# Patient Record
Sex: MALE | Race: WHITE | URBAN - METROPOLITAN AREA
[De-identification: names, ages, dates, MRNs, and addresses within clinical notes are randomized per-mention and may not be internally consistent; named-entity substitution may affect disease eponyms.]

---

## 2021-05-25 ENCOUNTER — HOSPITAL ENCOUNTER (OUTPATIENT)
Dept: MEDSURG UNIT | Facility: HOSPITAL | Age: 76
End: 2021-05-27
Attending: INTERNAL MEDICINE | Admitting: INTERNAL MEDICINE

## 2021-05-25 LAB
DEPRECATED CALCIDIOL+CALCIFEROL SERPL-MC: 16 NG/ML (ref 30–80)
FOLATE SERPL-MCNC: 9.4 NG/ML (ref 7–31.4)
PREALB SERPL-MCNC: 11.2 MG/DL (ref 16–42)
TSH SERPL-ACNC: 2.15 UIU/ML (ref 0.35–4.94)
VIT B12 SERPL-MCNC: 447 PG/ML (ref 213–816)

## 2021-05-26 LAB
ABS NEUT (OLG): 3.64 X10(3)/MCL (ref 2.1–9.2)
BASOPHILS # BLD AUTO: 0 X10(3)/MCL (ref 0–0.2)
BASOPHILS NFR BLD AUTO: 0 %
CHOLEST SERPL-MCNC: 146 MG/DL
CHOLEST/HDLC SERPL: 4 {RATIO} (ref 0–5)
EOSINOPHIL # BLD AUTO: 0.1 X10(3)/MCL (ref 0–0.9)
EOSINOPHIL NFR BLD AUTO: 1 %
ERYTHROCYTE [DISTWIDTH] IN BLOOD BY AUTOMATED COUNT: 14.4 % (ref 11.5–17)
HCT VFR BLD AUTO: 39.7 % (ref 42–52)
HDLC SERPL-MCNC: 35 MG/DL (ref 35–60)
HGB BLD-MCNC: 13 GM/DL (ref 14–18)
IMM GRANULOCYTES # BLD AUTO: 0.01 % (ref 0–0.02)
IMM GRANULOCYTES NFR BLD AUTO: 0.2 % (ref 0–0.43)
LDLC SERPL CALC-MCNC: 100 MG/DL (ref 50–140)
LYMPHOCYTES # BLD AUTO: 1.6 X10(3)/MCL (ref 0.6–4.6)
LYMPHOCYTES NFR BLD AUTO: 26 %
MCH RBC QN AUTO: 30.3 PG (ref 27–31)
MCHC RBC AUTO-ENTMCNC: 32.7 GM/DL (ref 33–36)
MCV RBC AUTO: 92.5 FL (ref 80–94)
MONOCYTES # BLD AUTO: 0.6 X10(3)/MCL (ref 0.1–1.3)
MONOCYTES NFR BLD AUTO: 11 %
NEUTROPHILS # BLD AUTO: 3.64 X10(3)/MCL (ref 1.4–7.9)
NEUTROPHILS NFR BLD AUTO: 62 %
PLATELET # BLD AUTO: 225 X10(3)/MCL (ref 130–400)
PMV BLD AUTO: 9.2 FL (ref 9.4–12.4)
RBC # BLD AUTO: 4.29 X10(6)/MCL (ref 4.7–6.1)
TRIGL SERPL-MCNC: 56 MG/DL (ref 34–140)
VLDLC SERPL CALC-MCNC: 11 MG/DL
WBC # SPEC AUTO: 5.9 X10(3)/MCL (ref 4.5–11.5)

## 2021-05-27 LAB
ABS NEUT (OLG): 3.48 X10(3)/MCL (ref 2.1–9.2)
ABS NEUT (OLG): 3.79 X10(3)/MCL (ref 2.1–9.2)
BASOPHILS # BLD AUTO: 0 X10(3)/MCL (ref 0–0.2)
BASOPHILS # BLD AUTO: 0 X10(3)/MCL (ref 0–0.2)
BASOPHILS NFR BLD AUTO: 0 %
BASOPHILS NFR BLD AUTO: 0 %
EOSINOPHIL # BLD AUTO: 0.1 X10(3)/MCL (ref 0–0.9)
EOSINOPHIL # BLD AUTO: 0.1 X10(3)/MCL (ref 0–0.9)
EOSINOPHIL NFR BLD AUTO: 2 %
EOSINOPHIL NFR BLD AUTO: 2 %
ERYTHROCYTE [DISTWIDTH] IN BLOOD BY AUTOMATED COUNT: 14.6 % (ref 11.5–17)
ERYTHROCYTE [DISTWIDTH] IN BLOOD BY AUTOMATED COUNT: 14.6 % (ref 11.5–17)
FOLATE SERPL-MCNC: 13.7 NG/ML (ref 7–31.4)
HCT VFR BLD AUTO: 39.3 % (ref 42–52)
HCT VFR BLD AUTO: 40.6 % (ref 42–52)
HGB BLD-MCNC: 12.8 GM/DL (ref 14–18)
HGB BLD-MCNC: 13.3 GM/DL (ref 14–18)
IMM GRANULOCYTES # BLD AUTO: 0.02 % (ref 0–0.02)
IMM GRANULOCYTES # BLD AUTO: 0.02 % (ref 0–0.02)
IMM GRANULOCYTES NFR BLD AUTO: 0.3 % (ref 0–0.43)
IMM GRANULOCYTES NFR BLD AUTO: 0.3 % (ref 0–0.43)
LYMPHOCYTES # BLD AUTO: 1.4 X10(3)/MCL (ref 0.6–4.6)
LYMPHOCYTES # BLD AUTO: 2 X10(3)/MCL (ref 0.6–4.6)
LYMPHOCYTES NFR BLD AUTO: 23 %
LYMPHOCYTES NFR BLD AUTO: 32 %
MCH RBC QN AUTO: 30.2 PG (ref 27–31)
MCH RBC QN AUTO: 30.6 PG (ref 27–31)
MCHC RBC AUTO-ENTMCNC: 32.6 GM/DL (ref 33–36)
MCHC RBC AUTO-ENTMCNC: 32.8 GM/DL (ref 33–36)
MCV RBC AUTO: 92.7 FL (ref 80–94)
MCV RBC AUTO: 93.3 FL (ref 80–94)
MONOCYTES # BLD AUTO: 0.7 X10(3)/MCL (ref 0.1–1.3)
MONOCYTES # BLD AUTO: 0.8 X10(3)/MCL (ref 0.1–1.3)
MONOCYTES NFR BLD AUTO: 11 %
MONOCYTES NFR BLD AUTO: 13 %
NEUTROPHILS # BLD AUTO: 3.48 X10(3)/MCL (ref 1.4–7.9)
NEUTROPHILS # BLD AUTO: 3.79 X10(3)/MCL (ref 1.4–7.9)
NEUTROPHILS NFR BLD AUTO: 55 %
NEUTROPHILS NFR BLD AUTO: 62 %
PLATELET # BLD AUTO: 229 X10(3)/MCL (ref 130–400)
PLATELET # BLD AUTO: 232 X10(3)/MCL (ref 130–400)
PMV BLD AUTO: 9.2 FL (ref 9.4–12.4)
PMV BLD AUTO: 9.2 FL (ref 9.4–12.4)
RBC # BLD AUTO: 4.24 X10(6)/MCL (ref 4.7–6.1)
RBC # BLD AUTO: 4.35 X10(6)/MCL (ref 4.7–6.1)
VIT B12 SERPL-MCNC: 466 PG/ML (ref 213–816)
WBC # SPEC AUTO: 6.2 X10(3)/MCL (ref 4.5–11.5)
WBC # SPEC AUTO: 6.4 X10(3)/MCL (ref 4.5–11.5)

## 2021-06-03 LAB
ABS NEUT (OLG): 4.57 X10(3)/MCL (ref 2.1–9.2)
BASOPHILS # BLD AUTO: 0 X10(3)/MCL (ref 0–0.2)
BASOPHILS NFR BLD AUTO: 1 %
BUN SERPL-MCNC: 16.7 MG/DL (ref 8.4–25.7)
CALCIUM SERPL-MCNC: 8.9 MG/DL (ref 8.8–10)
CHLORIDE SERPL-SCNC: 101 MMOL/L (ref 98–107)
CO2 SERPL-SCNC: 25 MMOL/L (ref 23–31)
CREAT SERPL-MCNC: 0.54 MG/DL (ref 0.73–1.18)
CREAT/UREA NIT SERPL: 31
EOSINOPHIL # BLD AUTO: 0.1 X10(3)/MCL (ref 0–0.9)
EOSINOPHIL NFR BLD AUTO: 1 %
ERYTHROCYTE [DISTWIDTH] IN BLOOD BY AUTOMATED COUNT: 14.4 % (ref 11.5–17)
GLUCOSE SERPL-MCNC: 92 MG/DL (ref 82–115)
HCT VFR BLD AUTO: 40.7 % (ref 42–52)
HGB BLD-MCNC: 13 GM/DL (ref 14–18)
IMM GRANULOCYTES # BLD AUTO: 0.07 % (ref 0–0.02)
IMM GRANULOCYTES NFR BLD AUTO: 1 % (ref 0–0.43)
LYMPHOCYTES # BLD AUTO: 1.5 X10(3)/MCL (ref 0.6–4.6)
LYMPHOCYTES NFR BLD AUTO: 22 %
MCH RBC QN AUTO: 30 PG (ref 27–31)
MCHC RBC AUTO-ENTMCNC: 31.9 GM/DL (ref 33–36)
MCV RBC AUTO: 94 FL (ref 80–94)
MONOCYTES # BLD AUTO: 0.7 X10(3)/MCL (ref 0.1–1.3)
MONOCYTES NFR BLD AUTO: 10 %
NEUTROPHILS # BLD AUTO: 4.57 X10(3)/MCL (ref 1.4–7.9)
NEUTROPHILS NFR BLD AUTO: 65 %
PLATELET # BLD AUTO: 327 X10(3)/MCL (ref 130–400)
PMV BLD AUTO: 9.7 FL (ref 9.4–12.4)
POTASSIUM SERPL-SCNC: 3.7 MMOL/L (ref 3.5–5.1)
RBC # BLD AUTO: 4.33 X10(6)/MCL (ref 4.7–6.1)
SODIUM SERPL-SCNC: 134 MMOL/L (ref 136–145)
WBC # SPEC AUTO: 7 X10(3)/MCL (ref 4.5–11.5)

## 2021-06-07 LAB
ALBUMIN SERPL-MCNC: 3 GM/DL (ref 3.4–4.8)
ALBUMIN/GLOB SERPL: 0.8 RATIO (ref 1.1–2)
ALP SERPL-CCNC: 74 UNIT/L (ref 40–150)
ALT SERPL-CCNC: 16 UNIT/L (ref 0–55)
AST SERPL-CCNC: 21 UNIT/L (ref 5–34)
BILIRUB SERPL-MCNC: 0.4 MG/DL
BILIRUBIN DIRECT+TOT PNL SERPL-MCNC: 0.1 MG/DL (ref 0–0.8)
BILIRUBIN DIRECT+TOT PNL SERPL-MCNC: 0.3 MG/DL (ref 0–0.5)
BUN SERPL-MCNC: 16.4 MG/DL (ref 8.4–25.7)
CALCIUM SERPL-MCNC: 9.2 MG/DL (ref 8.8–10)
CHLORIDE SERPL-SCNC: 103 MMOL/L (ref 98–107)
CHOLEST SERPL-MCNC: 87 MG/DL
CHOLEST/HDLC SERPL: 4 {RATIO} (ref 0–5)
CO2 SERPL-SCNC: 24 MMOL/L (ref 23–31)
CREAT SERPL-MCNC: 0.56 MG/DL (ref 0.73–1.18)
GLOBULIN SER-MCNC: 3.9 GM/DL (ref 2.4–3.5)
GLUCOSE SERPL-MCNC: 102 MG/DL (ref 82–115)
HDLC SERPL-MCNC: 22 MG/DL (ref 35–60)
LDLC SERPL CALC-MCNC: 57 MG/DL (ref 50–140)
POTASSIUM SERPL-SCNC: 3.3 MMOL/L (ref 3.5–5.1)
PROT SERPL-MCNC: 6.9 GM/DL (ref 5.8–7.6)
SODIUM SERPL-SCNC: 136 MMOL/L (ref 136–145)
TRIGL SERPL-MCNC: 40 MG/DL (ref 34–140)
VLDLC SERPL CALC-MCNC: 8 MG/DL

## 2021-06-09 LAB
ABS NEUT (OLG): 7.05 X10(3)/MCL (ref 2.1–9.2)
ALBUMIN SERPL-MCNC: 2.9 GM/DL (ref 3.4–4.8)
ALBUMIN/GLOB SERPL: 0.7 RATIO (ref 1.1–2)
ALP SERPL-CCNC: 77 UNIT/L (ref 40–150)
ALT SERPL-CCNC: 14 UNIT/L (ref 0–55)
AST SERPL-CCNC: 26 UNIT/L (ref 5–34)
BASOPHILS # BLD AUTO: 0 X10(3)/MCL (ref 0–0.2)
BASOPHILS NFR BLD AUTO: 0 %
BILIRUB SERPL-MCNC: 0.6 MG/DL
BILIRUBIN DIRECT+TOT PNL SERPL-MCNC: 0.2 MG/DL (ref 0–0.5)
BILIRUBIN DIRECT+TOT PNL SERPL-MCNC: 0.4 MG/DL (ref 0–0.8)
BUN SERPL-MCNC: 16.9 MG/DL (ref 8.4–25.7)
CALCIUM SERPL-MCNC: 9.1 MG/DL (ref 8.8–10)
CHLORIDE SERPL-SCNC: 103 MMOL/L (ref 98–107)
CO2 SERPL-SCNC: 20 MMOL/L (ref 23–31)
CREAT SERPL-MCNC: 0.52 MG/DL (ref 0.73–1.18)
EOSINOPHIL # BLD AUTO: 0.1 X10(3)/MCL (ref 0–0.9)
EOSINOPHIL NFR BLD AUTO: 1 %
ERYTHROCYTE [DISTWIDTH] IN BLOOD BY AUTOMATED COUNT: 14 % (ref 11.5–17)
GLOBULIN SER-MCNC: 3.9 GM/DL (ref 2.4–3.5)
GLUCOSE SERPL-MCNC: 89 MG/DL (ref 82–115)
HCT VFR BLD AUTO: 41.4 % (ref 42–52)
HGB BLD-MCNC: 13.9 GM/DL (ref 14–18)
IMM GRANULOCYTES # BLD AUTO: 0.07 % (ref 0–0.02)
IMM GRANULOCYTES NFR BLD AUTO: 0.7 % (ref 0–0.43)
LYMPHOCYTES # BLD AUTO: 1.6 X10(3)/MCL (ref 0.6–4.6)
LYMPHOCYTES NFR BLD AUTO: 16 %
MCH RBC QN AUTO: 30.8 PG (ref 27–31)
MCHC RBC AUTO-ENTMCNC: 33.6 GM/DL (ref 33–36)
MCV RBC AUTO: 91.8 FL (ref 80–94)
MONOCYTES # BLD AUTO: 0.8 X10(3)/MCL (ref 0.1–1.3)
MONOCYTES NFR BLD AUTO: 8 %
NEUTROPHILS # BLD AUTO: 7.05 X10(3)/MCL (ref 1.4–7.9)
NEUTROPHILS NFR BLD AUTO: 73 %
PLATELET # BLD AUTO: 389 X10(3)/MCL (ref 130–400)
PMV BLD AUTO: 9.7 FL (ref 9.4–12.4)
POTASSIUM SERPL-SCNC: 3 MMOL/L (ref 3.5–5.1)
PROT SERPL-MCNC: 6.8 GM/DL (ref 5.8–7.6)
RBC # BLD AUTO: 4.51 X10(6)/MCL (ref 4.7–6.1)
SODIUM SERPL-SCNC: 134 MMOL/L (ref 136–145)
WBC # SPEC AUTO: 9.7 X10(3)/MCL (ref 4.5–11.5)

## 2021-06-10 LAB
ABS NEUT (OLG): 5.27 X10(3)/MCL (ref 2.1–9.2)
ALBUMIN SERPL-MCNC: 2.7 GM/DL (ref 3.4–4.8)
ALBUMIN/GLOB SERPL: 0.7 RATIO (ref 1.1–2)
ALP SERPL-CCNC: 70 UNIT/L (ref 40–150)
ALT SERPL-CCNC: 13 UNIT/L (ref 0–55)
AST SERPL-CCNC: 15 UNIT/L (ref 5–34)
BASOPHILS # BLD AUTO: 0 X10(3)/MCL (ref 0–0.2)
BASOPHILS NFR BLD AUTO: 0 %
BILIRUB SERPL-MCNC: 1 MG/DL
BILIRUBIN DIRECT+TOT PNL SERPL-MCNC: 0.5 MG/DL (ref 0–0.5)
BILIRUBIN DIRECT+TOT PNL SERPL-MCNC: 0.5 MG/DL (ref 0–0.8)
BUN SERPL-MCNC: 16.6 MG/DL (ref 8.4–25.7)
CALCIUM SERPL-MCNC: 9.5 MG/DL (ref 8.8–10)
CHLORIDE SERPL-SCNC: 100 MMOL/L (ref 98–107)
CO2 SERPL-SCNC: 23 MMOL/L (ref 23–31)
CREAT SERPL-MCNC: 0.49 MG/DL (ref 0.73–1.18)
EOSINOPHIL # BLD AUTO: 0.1 X10(3)/MCL (ref 0–0.9)
EOSINOPHIL NFR BLD AUTO: 1 %
ERYTHROCYTE [DISTWIDTH] IN BLOOD BY AUTOMATED COUNT: 13.9 % (ref 11.5–17)
GLOBULIN SER-MCNC: 3.7 GM/DL (ref 2.4–3.5)
GLUCOSE SERPL-MCNC: 116 MG/DL (ref 82–115)
HCT VFR BLD AUTO: 39.7 % (ref 42–52)
HGB BLD-MCNC: 13.2 GM/DL (ref 14–18)
IMM GRANULOCYTES # BLD AUTO: 0.05 % (ref 0–0.02)
IMM GRANULOCYTES NFR BLD AUTO: 0.7 % (ref 0–0.43)
LYMPHOCYTES # BLD AUTO: 1.2 X10(3)/MCL (ref 0.6–4.6)
LYMPHOCYTES NFR BLD AUTO: 16 %
MCH RBC QN AUTO: 30 PG (ref 27–31)
MCHC RBC AUTO-ENTMCNC: 33.2 GM/DL (ref 33–36)
MCV RBC AUTO: 90.2 FL (ref 80–94)
MONOCYTES # BLD AUTO: 0.9 X10(3)/MCL (ref 0.1–1.3)
MONOCYTES NFR BLD AUTO: 12 %
NEUTROPHILS # BLD AUTO: 5.27 X10(3)/MCL (ref 1.4–7.9)
NEUTROPHILS NFR BLD AUTO: 70 %
PLATELET # BLD AUTO: 336 X10(3)/MCL (ref 130–400)
PMV BLD AUTO: 8.9 FL (ref 9.4–12.4)
POTASSIUM SERPL-SCNC: 3.1 MMOL/L (ref 3.5–5.1)
PROT SERPL-MCNC: 6.4 GM/DL (ref 5.8–7.6)
RBC # BLD AUTO: 4.4 X10(6)/MCL (ref 4.7–6.1)
SODIUM SERPL-SCNC: 132 MMOL/L (ref 136–145)
WBC # SPEC AUTO: 7.5 X10(3)/MCL (ref 4.5–11.5)

## 2021-06-11 ENCOUNTER — HISTORICAL (OUTPATIENT)
Dept: SURGERY | Facility: HOSPITAL | Age: 76
End: 2021-06-11

## 2021-06-12 LAB
ABS NEUT (OLG): 6.77 X10(3)/MCL (ref 2.1–9.2)
ALBUMIN SERPL-MCNC: 2.5 GM/DL (ref 3.4–4.8)
ALBUMIN/GLOB SERPL: 0.7 RATIO (ref 1.1–2)
ALP SERPL-CCNC: 63 UNIT/L (ref 40–150)
ALT SERPL-CCNC: 12 UNIT/L (ref 0–55)
AST SERPL-CCNC: 17 UNIT/L (ref 5–34)
BASOPHILS # BLD AUTO: 0 X10(3)/MCL (ref 0–0.2)
BASOPHILS NFR BLD AUTO: 0 %
BILIRUB SERPL-MCNC: 0.6 MG/DL
BILIRUBIN DIRECT+TOT PNL SERPL-MCNC: 0.2 MG/DL (ref 0–0.8)
BILIRUBIN DIRECT+TOT PNL SERPL-MCNC: 0.4 MG/DL (ref 0–0.5)
BUN SERPL-MCNC: 19.1 MG/DL (ref 8.4–25.7)
CALCIUM SERPL-MCNC: 8.5 MG/DL (ref 8.8–10)
CHLORIDE SERPL-SCNC: 100 MMOL/L (ref 98–107)
CO2 SERPL-SCNC: 22 MMOL/L (ref 23–31)
CREAT SERPL-MCNC: 0.61 MG/DL (ref 0.73–1.18)
EOSINOPHIL # BLD AUTO: 0.1 X10(3)/MCL (ref 0–0.9)
EOSINOPHIL NFR BLD AUTO: 1 %
ERYTHROCYTE [DISTWIDTH] IN BLOOD BY AUTOMATED COUNT: 14 % (ref 11.5–17)
GLOBULIN SER-MCNC: 3.6 GM/DL (ref 2.4–3.5)
GLUCOSE SERPL-MCNC: 103 MG/DL (ref 82–115)
HCT VFR BLD AUTO: 36.9 % (ref 42–52)
HGB BLD-MCNC: 12.4 GM/DL (ref 14–18)
IMM GRANULOCYTES # BLD AUTO: 0.09 % (ref 0–0.02)
IMM GRANULOCYTES NFR BLD AUTO: 0.9 % (ref 0–0.43)
LYMPHOCYTES # BLD AUTO: 1.7 X10(3)/MCL (ref 0.6–4.6)
LYMPHOCYTES NFR BLD AUTO: 18 %
MCH RBC QN AUTO: 30.8 PG (ref 27–31)
MCHC RBC AUTO-ENTMCNC: 33.6 GM/DL (ref 33–36)
MCV RBC AUTO: 91.8 FL (ref 80–94)
MONOCYTES # BLD AUTO: 1 X10(3)/MCL (ref 0.1–1.3)
MONOCYTES NFR BLD AUTO: 10 %
NEUTROPHILS # BLD AUTO: 6.77 X10(3)/MCL (ref 1.4–7.9)
NEUTROPHILS NFR BLD AUTO: 70 %
PLATELET # BLD AUTO: 315 X10(3)/MCL (ref 130–400)
PMV BLD AUTO: 9.7 FL (ref 9.4–12.4)
POTASSIUM SERPL-SCNC: 3 MMOL/L (ref 3.5–5.1)
PROT SERPL-MCNC: 6.1 GM/DL (ref 5.8–7.6)
RBC # BLD AUTO: 4.02 X10(6)/MCL (ref 4.7–6.1)
SODIUM SERPL-SCNC: 132 MMOL/L (ref 136–145)
WBC # SPEC AUTO: 9.7 X10(3)/MCL (ref 4.5–11.5)

## 2021-06-14 LAB
ABS NEUT (OLG): 7.5 X10(3)/MCL (ref 2.1–9.2)
ALBUMIN SERPL-MCNC: 2.5 GM/DL (ref 3.4–4.8)
ALBUMIN/GLOB SERPL: 0.6 RATIO (ref 1.1–2)
ALP SERPL-CCNC: 71 UNIT/L (ref 40–150)
ALT SERPL-CCNC: 30 UNIT/L (ref 0–55)
AST SERPL-CCNC: 38 UNIT/L (ref 5–34)
BASOPHILS # BLD AUTO: 0 X10(3)/MCL (ref 0–0.2)
BASOPHILS NFR BLD AUTO: 0 %
BILIRUB SERPL-MCNC: 0.6 MG/DL
BILIRUBIN DIRECT+TOT PNL SERPL-MCNC: 0.2 MG/DL (ref 0–0.8)
BILIRUBIN DIRECT+TOT PNL SERPL-MCNC: 0.4 MG/DL (ref 0–0.5)
BUN SERPL-MCNC: 16.1 MG/DL (ref 8.4–25.7)
CALCIUM SERPL-MCNC: 8.9 MG/DL (ref 8.8–10)
CHLORIDE SERPL-SCNC: 97 MMOL/L (ref 98–107)
CO2 SERPL-SCNC: 27 MMOL/L (ref 23–31)
CREAT SERPL-MCNC: 0.56 MG/DL (ref 0.73–1.18)
EOSINOPHIL # BLD AUTO: 0.1 X10(3)/MCL (ref 0–0.9)
EOSINOPHIL NFR BLD AUTO: 1 %
ERYTHROCYTE [DISTWIDTH] IN BLOOD BY AUTOMATED COUNT: 14 % (ref 11.5–17)
GLOBULIN SER-MCNC: 4 GM/DL (ref 2.4–3.5)
GLUCOSE SERPL-MCNC: 107 MG/DL (ref 82–115)
HCT VFR BLD AUTO: 36.1 % (ref 42–52)
HGB BLD-MCNC: 11.9 GM/DL (ref 14–18)
IMM GRANULOCYTES # BLD AUTO: 0.05 % (ref 0–0.02)
IMM GRANULOCYTES NFR BLD AUTO: 0.5 % (ref 0–0.43)
LYMPHOCYTES # BLD AUTO: 1.4 X10(3)/MCL (ref 0.6–4.6)
LYMPHOCYTES NFR BLD AUTO: 14 %
MCH RBC QN AUTO: 30.2 PG (ref 27–31)
MCHC RBC AUTO-ENTMCNC: 33 GM/DL (ref 33–36)
MCV RBC AUTO: 91.6 FL (ref 80–94)
MONOCYTES # BLD AUTO: 1 X10(3)/MCL (ref 0.1–1.3)
MONOCYTES NFR BLD AUTO: 10 %
NEUTROPHILS # BLD AUTO: 7.5 X10(3)/MCL (ref 1.4–7.9)
NEUTROPHILS NFR BLD AUTO: 74 %
PLATELET # BLD AUTO: 351 X10(3)/MCL (ref 130–400)
PMV BLD AUTO: 9.5 FL (ref 9.4–12.4)
POTASSIUM SERPL-SCNC: 3.3 MMOL/L (ref 3.5–5.1)
PROT SERPL-MCNC: 6.5 GM/DL (ref 5.8–7.6)
RBC # BLD AUTO: 3.94 X10(6)/MCL (ref 4.7–6.1)
SODIUM SERPL-SCNC: 133 MMOL/L (ref 136–145)
WBC # SPEC AUTO: 10.1 X10(3)/MCL (ref 4.5–11.5)

## 2021-06-15 LAB
BUN SERPL-MCNC: 14 MG/DL (ref 8.4–25.7)
CALCIUM SERPL-MCNC: 8.7 MG/DL (ref 8.8–10)
CHLORIDE SERPL-SCNC: 95 MMOL/L (ref 98–107)
CO2 SERPL-SCNC: 30 MMOL/L (ref 23–31)
CREAT SERPL-MCNC: 0.52 MG/DL (ref 0.73–1.18)
CREAT/UREA NIT SERPL: 27
GLUCOSE SERPL-MCNC: 122 MG/DL (ref 82–115)
MAGNESIUM SERPL-MCNC: 2.1 MG/DL (ref 1.6–2.6)
POTASSIUM SERPL-SCNC: 4.2 MMOL/L (ref 3.5–5.1)
SODIUM SERPL-SCNC: 134 MMOL/L (ref 136–145)

## 2021-06-20 LAB
BUN SERPL-MCNC: 12.7 MG/DL (ref 8.4–25.7)
CALCIUM SERPL-MCNC: 8.7 MG/DL (ref 8.8–10)
CHLORIDE SERPL-SCNC: 97 MMOL/L (ref 98–107)
CO2 SERPL-SCNC: 27 MMOL/L (ref 23–31)
CREAT SERPL-MCNC: 0.58 MG/DL (ref 0.73–1.18)
CREAT/UREA NIT SERPL: 22
GLUCOSE SERPL-MCNC: 118 MG/DL (ref 82–115)
POTASSIUM SERPL-SCNC: 4.8 MMOL/L (ref 3.5–5.1)
SODIUM SERPL-SCNC: 131 MMOL/L (ref 136–145)

## 2021-06-21 ENCOUNTER — HISTORICAL (OUTPATIENT)
Dept: ADMINISTRATIVE | Facility: HOSPITAL | Age: 76
End: 2021-06-21

## 2021-06-22 LAB
ABS NEUT (OLG): 5.45 X10(3)/MCL (ref 2.1–9.2)
ALBUMIN SERPL-MCNC: 2.8 GM/DL (ref 3.4–4.8)
ALBUMIN/GLOB SERPL: 0.7 RATIO (ref 1.1–2)
ALP SERPL-CCNC: 72 UNIT/L (ref 40–150)
ALT SERPL-CCNC: 32 UNIT/L (ref 0–55)
AST SERPL-CCNC: 29 UNIT/L (ref 5–34)
BASOPHILS # BLD AUTO: 0 X10(3)/MCL (ref 0–0.2)
BASOPHILS NFR BLD AUTO: 0 %
BILIRUB SERPL-MCNC: 0.3 MG/DL
BILIRUBIN DIRECT+TOT PNL SERPL-MCNC: 0.1 MG/DL (ref 0–0.8)
BILIRUBIN DIRECT+TOT PNL SERPL-MCNC: 0.2 MG/DL (ref 0–0.5)
BUN SERPL-MCNC: 14.7 MG/DL (ref 8.4–25.7)
CALCIUM SERPL-MCNC: 8.9 MG/DL (ref 8.8–10)
CHLORIDE SERPL-SCNC: 96 MMOL/L (ref 98–107)
CO2 SERPL-SCNC: 28 MMOL/L (ref 23–31)
CREAT SERPL-MCNC: 0.61 MG/DL (ref 0.73–1.18)
EOSINOPHIL # BLD AUTO: 0 X10(3)/MCL (ref 0–0.9)
EOSINOPHIL NFR BLD AUTO: 0 %
ERYTHROCYTE [DISTWIDTH] IN BLOOD BY AUTOMATED COUNT: 13.9 % (ref 11.5–17)
GLOBULIN SER-MCNC: 4 GM/DL (ref 2.4–3.5)
GLUCOSE SERPL-MCNC: 100 MG/DL (ref 82–115)
HCT VFR BLD AUTO: 36.9 % (ref 42–52)
HGB BLD-MCNC: 11.9 GM/DL (ref 14–18)
IMM GRANULOCYTES # BLD AUTO: 0.09 % (ref 0–0.02)
IMM GRANULOCYTES NFR BLD AUTO: 1.1 % (ref 0–0.43)
LYMPHOCYTES # BLD AUTO: 1.6 X10(3)/MCL (ref 0.6–4.6)
LYMPHOCYTES NFR BLD AUTO: 20 %
MAGNESIUM SERPL-MCNC: 2.3 MG/DL (ref 1.6–2.6)
MCH RBC QN AUTO: 29.9 PG (ref 27–31)
MCHC RBC AUTO-ENTMCNC: 32.2 GM/DL (ref 33–36)
MCV RBC AUTO: 92.7 FL (ref 80–94)
MONOCYTES # BLD AUTO: 0.9 X10(3)/MCL (ref 0.1–1.3)
MONOCYTES NFR BLD AUTO: 11 %
NEUTROPHILS # BLD AUTO: 5.45 X10(3)/MCL (ref 1.4–7.9)
NEUTROPHILS NFR BLD AUTO: 67 %
PHOSPHATE SERPL-MCNC: 4.4 MG/DL (ref 2.3–4.7)
PLATELET # BLD AUTO: 423 X10(3)/MCL (ref 130–400)
PMV BLD AUTO: 8.8 FL (ref 9.4–12.4)
POTASSIUM SERPL-SCNC: 4.7 MMOL/L (ref 3.5–5.1)
PROT SERPL-MCNC: 6.8 GM/DL (ref 5.8–7.6)
RBC # BLD AUTO: 3.98 X10(6)/MCL (ref 4.7–6.1)
SODIUM SERPL-SCNC: 131 MMOL/L (ref 136–145)
WBC # SPEC AUTO: 8.1 X10(3)/MCL (ref 4.5–11.5)

## 2021-06-24 LAB — FINAL CULTURE: NORMAL

## 2021-06-29 LAB
BUN SERPL-MCNC: 23.7 MG/DL (ref 8.4–25.7)
CALCIUM SERPL-MCNC: 9 MG/DL (ref 8.8–10)
CHLORIDE SERPL-SCNC: 97 MMOL/L (ref 98–107)
CO2 SERPL-SCNC: 24 MMOL/L (ref 23–31)
CREAT SERPL-MCNC: 0.6 MG/DL (ref 0.73–1.18)
CREAT/UREA NIT SERPL: 40
GLUCOSE SERPL-MCNC: 149 MG/DL (ref 82–115)
MAGNESIUM SERPL-MCNC: 2.2 MG/DL (ref 1.6–2.6)
POTASSIUM SERPL-SCNC: 4.6 MMOL/L (ref 3.5–5.1)
SODIUM SERPL-SCNC: 132 MMOL/L (ref 136–145)

## 2021-07-01 LAB
BUN SERPL-MCNC: 21.5 MG/DL (ref 8.4–25.7)
CALCIUM SERPL-MCNC: 9 MG/DL (ref 8.8–10)
CHLORIDE SERPL-SCNC: 94 MMOL/L (ref 98–107)
CO2 SERPL-SCNC: 31 MMOL/L (ref 23–31)
CREAT SERPL-MCNC: 0.63 MG/DL (ref 0.73–1.18)
CREAT/UREA NIT SERPL: 34
GLUCOSE SERPL-MCNC: 106 MG/DL (ref 82–115)
MAGNESIUM SERPL-MCNC: 2.3 MG/DL (ref 1.6–2.6)
POTASSIUM SERPL-SCNC: 4 MMOL/L (ref 3.5–5.1)
SODIUM SERPL-SCNC: 131 MMOL/L (ref 136–145)

## 2021-07-04 LAB
ABS NEUT (OLG): 8.97 X10(3)/MCL (ref 2.1–9.2)
ALBUMIN SERPL-MCNC: 2.8 GM/DL (ref 3.4–4.8)
ALBUMIN/GLOB SERPL: 0.9 RATIO (ref 1.1–2)
ALP SERPL-CCNC: 61 UNIT/L (ref 40–150)
ALT SERPL-CCNC: 30 UNIT/L (ref 0–55)
AST SERPL-CCNC: 21 UNIT/L (ref 5–34)
BASOPHILS # BLD AUTO: 0 X10(3)/MCL (ref 0–0.2)
BASOPHILS NFR BLD AUTO: 0 %
BILIRUB SERPL-MCNC: 0.3 MG/DL
BILIRUBIN DIRECT+TOT PNL SERPL-MCNC: 0.1 MG/DL (ref 0–0.8)
BILIRUBIN DIRECT+TOT PNL SERPL-MCNC: 0.2 MG/DL (ref 0–0.5)
BUN SERPL-MCNC: 21.7 MG/DL (ref 8.4–25.7)
CALCIUM SERPL-MCNC: 8.2 MG/DL (ref 8.8–10)
CHLORIDE SERPL-SCNC: 99 MMOL/L (ref 98–107)
CO2 SERPL-SCNC: 28 MMOL/L (ref 23–31)
CREAT SERPL-MCNC: 0.63 MG/DL (ref 0.73–1.18)
EOSINOPHIL # BLD AUTO: 0.7 X10(3)/MCL (ref 0–0.9)
EOSINOPHIL NFR BLD AUTO: 6 %
ERYTHROCYTE [DISTWIDTH] IN BLOOD BY AUTOMATED COUNT: 14.7 % (ref 11.5–17)
GLOBULIN SER-MCNC: 3.2 GM/DL (ref 2.4–3.5)
GLUCOSE SERPL-MCNC: 120 MG/DL (ref 82–115)
HCT VFR BLD AUTO: 34.8 % (ref 42–52)
HGB BLD-MCNC: 11.6 GM/DL (ref 14–18)
IMM GRANULOCYTES # BLD AUTO: 0.09 % (ref 0–0.02)
IMM GRANULOCYTES NFR BLD AUTO: 0.8 % (ref 0–0.43)
LYMPHOCYTES # BLD AUTO: 1.3 X10(3)/MCL (ref 0.6–4.6)
LYMPHOCYTES NFR BLD AUTO: 11 %
MCH RBC QN AUTO: 31.6 PG (ref 27–31)
MCHC RBC AUTO-ENTMCNC: 33.3 GM/DL (ref 33–36)
MCV RBC AUTO: 94.8 FL (ref 80–94)
MONOCYTES # BLD AUTO: 0.6 X10(3)/MCL (ref 0.1–1.3)
MONOCYTES NFR BLD AUTO: 5 %
NEUTROPHILS # BLD AUTO: 8.97 X10(3)/MCL (ref 1.4–7.9)
NEUTROPHILS NFR BLD AUTO: 77 %
PLATELET # BLD AUTO: 374 X10(3)/MCL (ref 130–400)
PMV BLD AUTO: 8.8 FL (ref 9.4–12.4)
POTASSIUM SERPL-SCNC: 4.4 MMOL/L (ref 3.5–5.1)
PROT SERPL-MCNC: 6 GM/DL (ref 5.8–7.6)
RBC # BLD AUTO: 3.67 X10(6)/MCL (ref 4.7–6.1)
SODIUM SERPL-SCNC: 135 MMOL/L (ref 136–145)
WBC # SPEC AUTO: 11.6 X10(3)/MCL (ref 4.5–11.5)

## 2021-07-13 LAB
HCT VFR BLD AUTO: 26.1 % (ref 42–52)
HEMOCCULT SP1 STL QL: POSITIVE
HGB BLD-MCNC: 8.6 GM/DL (ref 14–18)
SARS-COV-2 AG RESP QL IA.RAPID: NEGATIVE

## 2021-07-14 LAB
ABS NEUT (OLG): 4.68 X10(3)/MCL (ref 2.1–9.2)
BASOPHILS # BLD AUTO: 0 X10(3)/MCL (ref 0–0.2)
BASOPHILS NFR BLD AUTO: 1 %
BUN SERPL-MCNC: 11.8 MG/DL (ref 8.4–25.7)
CALCIUM SERPL-MCNC: 8.2 MG/DL (ref 8.8–10)
CHLORIDE SERPL-SCNC: 104 MMOL/L (ref 98–107)
CO2 SERPL-SCNC: 27 MMOL/L (ref 23–31)
CREAT SERPL-MCNC: 0.52 MG/DL (ref 0.73–1.18)
CREAT/UREA NIT SERPL: 23
EOSINOPHIL # BLD AUTO: 0.5 X10(3)/MCL (ref 0–0.9)
EOSINOPHIL NFR BLD AUTO: 7 %
ERYTHROCYTE [DISTWIDTH] IN BLOOD BY AUTOMATED COUNT: 15.3 % (ref 11.5–17)
GLUCOSE SERPL-MCNC: 110 MG/DL (ref 82–115)
HCT VFR BLD AUTO: 25.8 % (ref 42–52)
HGB BLD-MCNC: 8.3 GM/DL (ref 14–18)
IMM GRANULOCYTES # BLD AUTO: 0.03 % (ref 0–0.02)
IMM GRANULOCYTES NFR BLD AUTO: 0.4 % (ref 0–0.43)
LYMPHOCYTES # BLD AUTO: 1.1 X10(3)/MCL (ref 0.6–4.6)
LYMPHOCYTES NFR BLD AUTO: 16 %
MCH RBC QN AUTO: 31 PG (ref 27–31)
MCHC RBC AUTO-ENTMCNC: 32.2 GM/DL (ref 33–36)
MCV RBC AUTO: 96.3 FL (ref 80–94)
MONOCYTES # BLD AUTO: 0.6 X10(3)/MCL (ref 0.1–1.3)
MONOCYTES NFR BLD AUTO: 9 %
NEUTROPHILS # BLD AUTO: 4.68 X10(3)/MCL (ref 1.4–7.9)
NEUTROPHILS NFR BLD AUTO: 67 %
PLATELET # BLD AUTO: 268 X10(3)/MCL (ref 130–400)
PMV BLD AUTO: 8.9 FL (ref 9.4–12.4)
POTASSIUM SERPL-SCNC: 4.1 MMOL/L (ref 3.5–5.1)
RBC # BLD AUTO: 2.68 X10(6)/MCL (ref 4.7–6.1)
SODIUM SERPL-SCNC: 136 MMOL/L (ref 136–145)
WBC # SPEC AUTO: 7 X10(3)/MCL (ref 4.5–11.5)

## 2021-07-15 LAB
ABS NEUT (OLG): 4.38 X10(3)/MCL (ref 2.1–9.2)
APTT PPP: 27.6 SECOND(S) (ref 24.5–34.9)
BASOPHILS # BLD AUTO: 0 X10(3)/MCL (ref 0–0.2)
BASOPHILS NFR BLD AUTO: 1 %
EOSINOPHIL # BLD AUTO: 0.4 X10(3)/MCL (ref 0–0.9)
EOSINOPHIL NFR BLD AUTO: 7 %
ERYTHROCYTE [DISTWIDTH] IN BLOOD BY AUTOMATED COUNT: 15.5 % (ref 11.5–17)
HCT VFR BLD AUTO: 26 % (ref 42–52)
HGB BLD-MCNC: 8.3 GM/DL (ref 14–18)
IMM GRANULOCYTES # BLD AUTO: 0.02 % (ref 0–0.02)
IMM GRANULOCYTES NFR BLD AUTO: 0.3 % (ref 0–0.43)
INR PPP: 0.91 (ref 2–3)
LYMPHOCYTES # BLD AUTO: 1.1 X10(3)/MCL (ref 0.6–4.6)
LYMPHOCYTES NFR BLD AUTO: 17 %
MCH RBC QN AUTO: 30.7 PG (ref 27–31)
MCHC RBC AUTO-ENTMCNC: 31.9 GM/DL (ref 33–36)
MCV RBC AUTO: 96.3 FL (ref 80–94)
MONOCYTES # BLD AUTO: 0.7 X10(3)/MCL (ref 0.1–1.3)
MONOCYTES NFR BLD AUTO: 10 %
NEUTROPHILS # BLD AUTO: 4.38 X10(3)/MCL (ref 1.4–7.9)
NEUTROPHILS NFR BLD AUTO: 65 %
PLATELET # BLD AUTO: 273 X10(3)/MCL (ref 130–400)
PMV BLD AUTO: 9.2 FL (ref 9.4–12.4)
PROTHROMBIN TIME: 9.6 SECOND(S) (ref 9.3–11.4)
RBC # BLD AUTO: 2.7 X10(6)/MCL (ref 4.7–6.1)
WBC # SPEC AUTO: 6.7 X10(3)/MCL (ref 4.5–11.5)

## 2021-07-16 ENCOUNTER — HISTORICAL (OUTPATIENT)
Dept: SURGERY | Facility: HOSPITAL | Age: 76
End: 2021-07-16

## 2021-07-19 LAB
ABS NEUT (OLG): 5.51 X10(3)/MCL (ref 2.1–9.2)
BASOPHILS # BLD AUTO: 0 X10(3)/MCL (ref 0–0.2)
BASOPHILS NFR BLD AUTO: 0 %
EOSINOPHIL # BLD AUTO: 0.3 X10(3)/MCL (ref 0–0.9)
EOSINOPHIL NFR BLD AUTO: 3 %
ERYTHROCYTE [DISTWIDTH] IN BLOOD BY AUTOMATED COUNT: 15.8 % (ref 11.5–17)
HCT VFR BLD AUTO: 23.8 % (ref 42–52)
HGB BLD-MCNC: 7.5 GM/DL (ref 14–18)
IMM GRANULOCYTES # BLD AUTO: 0.03 % (ref 0–0.02)
IMM GRANULOCYTES NFR BLD AUTO: 0.4 % (ref 0–0.43)
LYMPHOCYTES # BLD AUTO: 1.3 X10(3)/MCL (ref 0.6–4.6)
LYMPHOCYTES NFR BLD AUTO: 16 %
MCH RBC QN AUTO: 31 PG (ref 27–31)
MCHC RBC AUTO-ENTMCNC: 31.5 GM/DL (ref 33–36)
MCV RBC AUTO: 98.3 FL (ref 80–94)
MONOCYTES # BLD AUTO: 1.1 X10(3)/MCL (ref 0.1–1.3)
MONOCYTES NFR BLD AUTO: 13 %
NEUTROPHILS # BLD AUTO: 5.51 X10(3)/MCL (ref 1.4–7.9)
NEUTROPHILS NFR BLD AUTO: 67 %
PLATELET # BLD AUTO: 371 X10(3)/MCL (ref 130–400)
PMV BLD AUTO: 9.4 FL (ref 9.4–12.4)
RBC # BLD AUTO: 2.42 X10(6)/MCL (ref 4.7–6.1)
SARS-COV-2 AG RESP QL IA.RAPID: NEGATIVE
WBC # SPEC AUTO: 8.2 X10(3)/MCL (ref 4.5–11.5)

## 2021-07-20 LAB
ABS NEUT (OLG): 5.71 X10(3)/MCL (ref 2.1–9.2)
BASOPHILS # BLD AUTO: 0 X10(3)/MCL (ref 0–0.2)
BASOPHILS NFR BLD AUTO: 0 %
EOSINOPHIL # BLD AUTO: 0.2 X10(3)/MCL (ref 0–0.9)
EOSINOPHIL NFR BLD AUTO: 3 %
ERYTHROCYTE [DISTWIDTH] IN BLOOD BY AUTOMATED COUNT: 15.8 % (ref 11.5–17)
FERRITIN SERPL-MCNC: 156.2 NG/ML (ref 21.81–274.66)
GROUP & RH: NORMAL
HCT VFR BLD AUTO: 23.2 % (ref 42–52)
HGB BLD-MCNC: 7.5 GM/DL (ref 14–18)
IMM GRANULOCYTES # BLD AUTO: 0.05 % (ref 0–0.02)
IMM GRANULOCYTES NFR BLD AUTO: 0.6 % (ref 0–0.43)
IRON SATN MFR SERPL: 15 % (ref 20–50)
IRON SERPL-MCNC: 30 UG/DL (ref 65–175)
LYMPHOCYTES # BLD AUTO: 1.1 X10(3)/MCL (ref 0.6–4.6)
LYMPHOCYTES NFR BLD AUTO: 14 %
MCH RBC QN AUTO: 31.5 PG (ref 27–31)
MCHC RBC AUTO-ENTMCNC: 32.3 GM/DL (ref 33–36)
MCV RBC AUTO: 97.5 FL (ref 80–94)
MONOCYTES # BLD AUTO: 0.8 X10(3)/MCL (ref 0.1–1.3)
MONOCYTES NFR BLD AUTO: 10 %
NEUTROPHILS # BLD AUTO: 5.71 X10(3)/MCL (ref 1.4–7.9)
NEUTROPHILS NFR BLD AUTO: 72 %
PLATELET # BLD AUTO: 404 X10(3)/MCL (ref 130–400)
PMV BLD AUTO: 9.1 FL (ref 9.4–12.4)
PRODUCT READY: NORMAL
RBC # BLD AUTO: 2.38 X10(6)/MCL (ref 4.7–6.1)
TIBC SERPL-MCNC: 170 UG/DL (ref 69–240)
TIBC SERPL-MCNC: 200 UG/DL (ref 250–450)
TRANSFERRIN SERPL-MCNC: 157 MG/DL (ref 163–344)
TRANSFUSION ORDER: NORMAL
WBC # SPEC AUTO: 7.9 X10(3)/MCL (ref 4.5–11.5)

## 2021-07-21 LAB
ABS NEUT (OLG): 8.09 X10(3)/MCL (ref 2.1–9.2)
BASOPHILS # BLD AUTO: 0 X10(3)/MCL (ref 0–0.2)
BASOPHILS NFR BLD AUTO: 0 %
EOSINOPHIL # BLD AUTO: 0.2 X10(3)/MCL (ref 0–0.9)
EOSINOPHIL NFR BLD AUTO: 2 %
ERYTHROCYTE [DISTWIDTH] IN BLOOD BY AUTOMATED COUNT: 19.2 % (ref 11.5–17)
HCT VFR BLD AUTO: 34.3 % (ref 42–52)
HGB BLD-MCNC: 10.9 GM/DL (ref 14–18)
IMM GRANULOCYTES # BLD AUTO: 0.11 % (ref 0–0.02)
IMM GRANULOCYTES NFR BLD AUTO: 1.1 % (ref 0–0.43)
LYMPHOCYTES # BLD AUTO: 1 X10(3)/MCL (ref 0.6–4.6)
LYMPHOCYTES NFR BLD AUTO: 10 %
MCH RBC QN AUTO: 29 PG (ref 27–31)
MCHC RBC AUTO-ENTMCNC: 31.8 GM/DL (ref 33–36)
MCV RBC AUTO: 91.2 FL (ref 80–94)
MONOCYTES # BLD AUTO: 0.9 X10(3)/MCL (ref 0.1–1.3)
MONOCYTES NFR BLD AUTO: 8 %
NEUTROPHILS # BLD AUTO: 8.09 X10(3)/MCL (ref 1.4–7.9)
NEUTROPHILS NFR BLD AUTO: 78 %
PLATELET # BLD AUTO: 466 X10(3)/MCL (ref 130–400)
PMV BLD AUTO: 9 FL (ref 9.4–12.4)
RBC # BLD AUTO: 3.76 X10(6)/MCL (ref 4.7–6.1)
WBC # SPEC AUTO: 10.3 X10(3)/MCL (ref 4.5–11.5)

## 2021-08-27 ENCOUNTER — HISTORICAL (OUTPATIENT)
Dept: ADMINISTRATIVE | Facility: HOSPITAL | Age: 76
End: 2021-08-27

## 2021-08-27 LAB — IRON SERPL-MCNC: 26 UG/DL (ref 65–175)

## 2021-09-02 ENCOUNTER — HISTORICAL (OUTPATIENT)
Dept: ADMINISTRATIVE | Facility: HOSPITAL | Age: 76
End: 2021-09-02

## 2021-09-02 LAB
ALBUMIN SERPL-MCNC: 2.8 GM/DL (ref 3.4–4.8)
ALBUMIN/GLOB SERPL: 1 RATIO (ref 1.1–2)
ALP SERPL-CCNC: 112 UNIT/L (ref 40–150)
ALT SERPL-CCNC: 24 UNIT/L (ref 0–55)
AST SERPL-CCNC: 25 UNIT/L (ref 5–34)
BILIRUB SERPL-MCNC: 0.4 MG/DL
BILIRUBIN DIRECT+TOT PNL SERPL-MCNC: 0.2 MG/DL (ref 0–0.5)
BILIRUBIN DIRECT+TOT PNL SERPL-MCNC: 0.2 MG/DL (ref 0–0.8)
BUN SERPL-MCNC: 9.7 MG/DL (ref 8.4–25.7)
CALCIUM SERPL-MCNC: 8.5 MG/DL (ref 8.8–10)
CHLORIDE SERPL-SCNC: 96 MMOL/L (ref 98–107)
CO2 SERPL-SCNC: 27 MMOL/L (ref 23–31)
CREAT SERPL-MCNC: 0.47 MG/DL (ref 0.73–1.18)
ERYTHROCYTE [DISTWIDTH] IN BLOOD BY AUTOMATED COUNT: 15.8 % (ref 11.5–17)
GLOBULIN SER-MCNC: 2.8 GM/DL (ref 2.4–3.5)
GLUCOSE SERPL-MCNC: 74 MG/DL (ref 82–115)
HCT VFR BLD AUTO: 31.1 % (ref 42–52)
HGB BLD-MCNC: 10.1 GM/DL (ref 14–18)
MCH RBC QN AUTO: 30 PG (ref 27–31)
MCHC RBC AUTO-ENTMCNC: 32.5 GM/DL (ref 33–36)
MCV RBC AUTO: 92.3 FL (ref 80–94)
PLATELET # BLD AUTO: 389 X10(3)/MCL (ref 130–400)
PMV BLD AUTO: 8.9 FL (ref 9.4–12.4)
POTASSIUM SERPL-SCNC: 3.3 MMOL/L (ref 3.5–5.1)
PROT SERPL-MCNC: 5.6 GM/DL (ref 5.8–7.6)
RBC # BLD AUTO: 3.37 X10(6)/MCL (ref 4.7–6.1)
SODIUM SERPL-SCNC: 133 MMOL/L (ref 136–145)
WBC # SPEC AUTO: 9.2 X10(3)/MCL (ref 4.5–11.5)

## 2021-09-21 ENCOUNTER — HOSPITAL ENCOUNTER (OUTPATIENT)
Dept: MEDSURG UNIT | Facility: HOSPITAL | Age: 76
End: 2021-09-23
Attending: INTERNAL MEDICINE | Admitting: INTERNAL MEDICINE

## 2021-09-22 LAB
ABS NEUT (OLG): 6.59 X10(3)/MCL (ref 2.1–9.2)
ALBUMIN SERPL-MCNC: 2.5 GM/DL (ref 3.4–4.8)
ALP SERPL-CCNC: 82 UNIT/L (ref 40–150)
ALT SERPL-CCNC: 14 UNIT/L (ref 0–55)
AST SERPL-CCNC: 28 UNIT/L (ref 5–34)
BASOPHILS # BLD AUTO: 0 X10(3)/MCL (ref 0–0.2)
BASOPHILS NFR BLD AUTO: 0 %
BILIRUB SERPL-MCNC: 0.6 MG/DL
BILIRUBIN DIRECT+TOT PNL SERPL-MCNC: 0.3 MG/DL (ref 0–0.5)
BILIRUBIN DIRECT+TOT PNL SERPL-MCNC: 0.3 MG/DL (ref 0–0.8)
BUN SERPL-MCNC: 13.1 MG/DL (ref 8.4–25.7)
CALCIUM SERPL-MCNC: 8.4 MG/DL (ref 8.8–10)
CHLORIDE SERPL-SCNC: 98 MMOL/L (ref 98–107)
CO2 SERPL-SCNC: 27 MMOL/L (ref 23–31)
CREAT SERPL-MCNC: 0.5 MG/DL (ref 0.73–1.18)
CREAT/UREA NIT SERPL: 26
EOSINOPHIL # BLD AUTO: 0.1 X10(3)/MCL (ref 0–0.9)
EOSINOPHIL NFR BLD AUTO: 1 %
ERYTHROCYTE [DISTWIDTH] IN BLOOD BY AUTOMATED COUNT: 15.5 % (ref 11.5–17)
GLUCOSE SERPL-MCNC: 82 MG/DL (ref 82–115)
HCT VFR BLD AUTO: 36.7 % (ref 42–52)
HGB BLD-MCNC: 11 GM/DL (ref 14–18)
IMM GRANULOCYTES # BLD AUTO: 0.03 % (ref 0–0.02)
IMM GRANULOCYTES NFR BLD AUTO: 0.3 % (ref 0–0.43)
LYMPHOCYTES # BLD AUTO: 1.4 X10(3)/MCL (ref 0.6–4.6)
LYMPHOCYTES NFR BLD AUTO: 15 %
MCH RBC QN AUTO: 28.4 PG (ref 27–31)
MCHC RBC AUTO-ENTMCNC: 30 GM/DL (ref 33–36)
MCV RBC AUTO: 94.6 FL (ref 80–94)
MONOCYTES # BLD AUTO: 1 X10(3)/MCL (ref 0.1–1.3)
MONOCYTES NFR BLD AUTO: 11 %
NEUTROPHILS # BLD AUTO: 6.59 X10(3)/MCL (ref 1.4–7.9)
NEUTROPHILS NFR BLD AUTO: 72 %
PLATELET # BLD AUTO: 408 X10(3)/MCL (ref 130–400)
PMV BLD AUTO: 8.9 FL (ref 9.4–12.4)
POTASSIUM SERPL-SCNC: 4.6 MMOL/L (ref 3.5–5.1)
PROT SERPL-MCNC: 5.6 GM/DL (ref 5.8–7.6)
RBC # BLD AUTO: 3.88 X10(6)/MCL (ref 4.7–6.1)
SODIUM SERPL-SCNC: 138 MMOL/L (ref 136–145)
WBC # SPEC AUTO: 9.2 X10(3)/MCL (ref 4.5–11.5)

## 2021-09-23 LAB
ABS NEUT (OLG): 6.03 X10(3)/MCL (ref 2.1–9.2)
ALBUMIN SERPL-MCNC: 2.4 GM/DL (ref 3.4–4.8)
ALBUMIN/GLOB SERPL: 0.8 RATIO (ref 1.1–2)
ALP SERPL-CCNC: 81 UNIT/L (ref 40–150)
ALT SERPL-CCNC: 14 UNIT/L (ref 0–55)
AST SERPL-CCNC: 26 UNIT/L (ref 5–34)
BASOPHILS # BLD AUTO: 0 X10(3)/MCL (ref 0–0.2)
BASOPHILS NFR BLD AUTO: 0 %
BILIRUB SERPL-MCNC: 0.5 MG/DL
BILIRUBIN DIRECT+TOT PNL SERPL-MCNC: 0.2 MG/DL (ref 0–0.5)
BILIRUBIN DIRECT+TOT PNL SERPL-MCNC: 0.3 MG/DL (ref 0–0.8)
BUN SERPL-MCNC: 11.4 MG/DL (ref 8.4–25.7)
CALCIUM SERPL-MCNC: 9.1 MG/DL (ref 8.8–10)
CHLORIDE SERPL-SCNC: 101 MMOL/L (ref 98–107)
CO2 SERPL-SCNC: 32 MMOL/L (ref 23–31)
CREAT SERPL-MCNC: 0.56 MG/DL (ref 0.73–1.18)
EOSINOPHIL # BLD AUTO: 0.1 X10(3)/MCL (ref 0–0.9)
EOSINOPHIL NFR BLD AUTO: 2 %
ERYTHROCYTE [DISTWIDTH] IN BLOOD BY AUTOMATED COUNT: 15.7 % (ref 11.5–17)
GLOBULIN SER-MCNC: 3.2 GM/DL (ref 2.4–3.5)
GLUCOSE SERPL-MCNC: 97 MG/DL (ref 82–115)
HCT VFR BLD AUTO: 35 % (ref 42–52)
HGB BLD-MCNC: 10.4 GM/DL (ref 14–18)
LYMPHOCYTES # BLD AUTO: 1.1 X10(3)/MCL (ref 0.6–4.6)
LYMPHOCYTES NFR BLD AUTO: 13 %
MCH RBC QN AUTO: 28.1 PG (ref 27–31)
MCHC RBC AUTO-ENTMCNC: 29.7 GM/DL (ref 33–36)
MCV RBC AUTO: 94.6 FL (ref 80–94)
MONOCYTES # BLD AUTO: 0.8 X10(3)/MCL (ref 0.1–1.3)
MONOCYTES NFR BLD AUTO: 10 %
NEUTROPHILS # BLD AUTO: 6.03 X10(3)/MCL (ref 1.4–7.9)
NEUTROPHILS NFR BLD AUTO: 74 %
PLATELET # BLD AUTO: 414 X10(3)/MCL (ref 130–400)
PMV BLD AUTO: 9 FL (ref 9.4–12.4)
POTASSIUM SERPL-SCNC: 5.9 MMOL/L (ref 3.5–5.1)
PROT SERPL-MCNC: 5.6 GM/DL (ref 5.8–7.6)
RBC # BLD AUTO: 3.7 X10(6)/MCL (ref 4.7–6.1)
SODIUM SERPL-SCNC: 138 MMOL/L (ref 136–145)
WBC # SPEC AUTO: 8.1 X10(3)/MCL (ref 4.5–11.5)

## 2021-10-04 LAB — EST CREAT CLEARANCE SER (OHS): 165.67 ML/MIN

## 2022-04-30 NOTE — H&P
Patient:   Bigg Ayala            MRN: [id number]            FIN: [id number]               Age:   75 years     Sex:  Male     :  1945   Associated Diagnoses:   None   Author:   Ty Acuña MD      Basic Information   75-year-old male with no known medical problems (but has not seen a provider in over 10 years) was brought in by friends for what sounds like progressive failure to thrive and progressive poor p.o. intake with possible alcohol overuse.  Patient has slowed mentation but is otherwise alert and oriented and just complains of maybe feeling a little weak.  He is really not sure why his friends brought him here.  He lives in a trailer behind a friend's house.  Does not sound like he has been eating much lately and says he has not eaten anything in the past 2 days prior to admit.  Denies having poor access to food (but this may be the case).  MRII of brain =  Left corona radiata extending to the external capsule acute to subacute infarct, and left posterior corona radiata acute lacunar infarct.         Chief Complaint      Review of Systems   Positive for weakness and fatigue.  Possible weight loss of an unspecified amount.  10 systems reviewed and negative except as noted.      Health Status   Allergies:    Allergic Reactions (Selected)  No Known Allergies,    Allergies (1) Active Reaction  No Known Allergies None Documented     Current medications:  (Selected)   Inpatient Medications  Ordered  Cozaar: 100 mg, form: Tab, Oral, Daily, first dose 21 9:00:00 CDT  D5W 1000mL 1,000 mL: 1,000 mL, 1,000 mL, IV, 75 mL/hr, start date 21 16:43:00 CDT, 1.76, m2  Lipitor: 40 mg, form: Tab, Oral, Daily, first dose 21 17:00:00 CDT  Lovenox: 40 mg, form: Injection, Subcutaneous, Daily, first dose 21 9:00:00 CDT  Norvasc: 10 mg, form: Tab, Oral, Daily, first dose 21 9:00:00 CDT  Zofran: 4 mg, form: Injection, IV Push, q4hr PRN for nausea, first dose 21 15:13:00  CDT, choose first if ordered with other treatments for nausea  acetaminophen-hydrocodone 325 mg-5 mg oral tablet: 1 tab(s), form: Tab, Oral, q4hr PRN for pain, first dose 05/27/21 15:11:00 CDT  acetaminophen: 1,000 mg, form: Tab, Oral, q6hr PRN for pain, first dose 05/27/21 15:13:00 CDT, mild/moderate  acetaminophen: 650 mg, form: Tab, Oral, q6hr PRN for fever, first dose 05/27/21 15:13:00 CDT, > 38.1 degrees Celsius  aspirin 81 mg oral Delayed Release (EC) tablet: 81 mg, form: Tab-EC, Oral, Daily, first dose 05/28/21 9:00:00 CDT  folic acid 1 mg oral tablet: 1 mg, form: Tab, Oral, Daily, first dose 05/28/21 9:00:00 CDT  labetalol: 20 mg, form: Soln, IV Push, q2hr PRN for hypertension, first dose 05/27/21 15:13:00 CDT, SBP > 180 and/or DBP > 110 not to exceed 300mg/24hrs  multivitamin with minerals (Adult Tab): 1 tab(s), form: Tab, Oral, Daily, first dose 05/27/21 15:11:00 CDT  potassium chloride 20 mEq oral TABLET extended release: 20 mEq, form: Tab-ER, Oral, BID, first dose 05/27/21 21:00:00 CDT  Documented Medications  Documented  Cozaar 50 mg oral tablet: 100 mg = 2 tab(s), Oral, Daily, 0 Refill(s)  Lipitor 40 mg oral tablet: 40 mg = 1 tab(s), Oral, Daily, 0 Refill(s)  Lovenox: 40 mg = 0.4 mL, Subcutaneous, Daily, 0 Refill(s)  Multi-Day Plus Minerals oral tablet: 1 tab(s), Oral, Daily, # 30 tab(s), 0 Refill(s)  Norvasc 5 mg oral tablet: 10 mg = 2 tab(s), Oral, Daily, 0 Refill(s)  Zofran 2 mg/mL injectable solution: 4 mg = 2 mL, IV Push, q4hr, PRN PRN nausea, 0 Refill(s)  acetaminophen 325 mg oral tablet: 650 mg = 2 tab(s), Oral, q6hr, PRN PRN fever, 0 Refill(s)  acetaminophen 500 mg oral tablet: 1,000 mg = 2 tab(s), Oral, q6hr, PRN PRN pain, 0 Refill(s)  acetaminophen-HYDROcodone: Oral, q4hr, PRN PRN pain, 0 Refill(s)  aspirin 81 mg oral Delayed Release (EC) tablet: 81 mg = 1 tab(s), Oral, Daily, 0 Refill(s)  folic acid 1 mg oral tablet: 1 mg = 1 tab(s), Oral, Daily, 0 Refill(s)  labetalol 5 mg/mL intravenous  solution: 20 mg = 4 mL, IV Push, q2hr, PRN PRN hypertension, 0 Refill(s)  potassium chloride 20 mEq oral TABLET extended release: 20 mEq = 1 tab(s), Oral, BID, 0 Refill(s)   Problem list:    All Problems  At risk of pressure sore / SNOMED CT 813532156 / Confirmed  Malnutrition / SNOMED CT 482822775 / Confirmed  HTN (hypertension) / SNOMED CT 5069573630 / Confirmed  Malnutrition / SNOMED CT 742416441 / Confirmed  Tobacco user / SNOMED CT 690880355 / Probable,    Active Problems (5)  At risk of pressure sore   HTN (hypertension)   Malnutrition   Malnutrition   Tobacco user         Histories   Past Medical History:    Resolved  Alcoholism (30657821):  Resolved.   Family History:    No family history items have been selected or recorded.   Procedure history:    No active procedure history items have been selected or recorded.   Social History        Social & Psychosocial Habits    Alcohol  07/03/2017  Use: Current    Type: Beer    Frequency: 1-2 times per week    05/27/2021  Use: Current    Type: Beer, Liquor    Frequency: Daily    Substance Use  05/25/2021  Use: Never    Tobacco  07/03/2017  Use: Current every day smoker    Type: Cigarettes    05/25/2021  Use: 10 or more cigarettes (1/    Type: Cigarettes    Patient Wants Consult For Cessation Counseling No    05/27/2021  Use: 10 or more cigarettes (1/    Patient Wants Consult For Cessation Counseling N/A    Abuse/Neglect  05/25/2021  SHX Any signs of abuse or neglect No    05/27/2021  SHX Any signs of abuse or neglect No    Feels unsafe at home: No    Safe place to go: Yes  .     Family history is unknown according to patient      Physical Examination   Vital Signs   5/31/2021 7:00 CDT       Temperature Oral          37 DegC                             Temperature Oral (calculated)             98.60 DegF                             Peripheral Pulse Rate     63 bpm                             SpO2                      92 %  LOW                             Systolic  Blood Pressure   153 mmHg  HI                             Diastolic Blood Pressure  85 mmHg    5/31/2021 4:52 CDT       Temperature Oral          37 DegC                             Temperature Oral (calculated)             98.60 DegF                             Peripheral Pulse Rate     64 bpm                             SpO2                      95 %                             Systolic Blood Pressure   150 mmHg  HI                             Diastolic Blood Pressure  79 mmHg                             Mean Arterial Pressure, Cuff              103 mmHg    5/30/2021 23:42 CDT      Temperature Oral          37.2 DegC                             Temperature Oral (calculated)             98.96 DegF                             Peripheral Pulse Rate     70 bpm                             SpO2                      95 %                             Systolic Blood Pressure   152 mmHg  HI                             Diastolic Blood Pressure  75 mmHg                             Mean Arterial Pressure, Cuff              100 mmHg    5/30/2021 20:06 CDT      Temperature Oral          36.8 DegC                             Temperature Oral (calculated)             98.24 DegF                             Peripheral Pulse Rate     72 bpm                             SpO2                      97 %                             Systolic Blood Pressure   147 mmHg  HI                             Diastolic Blood Pressure  77 mmHg                             Mean Arterial Pressure, Cuff              100 mmHg    5/30/2021 15:25 CDT      Temperature Oral          36.5 DegC                             Temperature Oral (calculated)             97.70 DegF                             Peripheral Pulse Rate     71 bpm                             SpO2                      97 %                             Systolic Blood Pressure   158 mmHg  HI                             Diastolic Blood Pressure  75 mmHg                             Mean Arterial  Pressure, Cuff              103 mmHg    5/30/2021 8:49 CDT       Temperature Oral          36.7 DegC                             Temperature Oral (calculated)             98.06 DegF                             Peripheral Pulse Rate     69 bpm                             SpO2                      95 %                             Systolic Blood Pressure   148 mmHg  HI                             Diastolic Blood Pressure  80 mmHg                             Mean Arterial Pressure, Cuff              103 mmHg    5/30/2021 3:38 CDT       Temperature Oral          37 DegC                             Temperature Oral (calculated)             98.60 DegF                             Peripheral Pulse Rate     64 bpm                             SpO2                      98 %                             Systolic Blood Pressure   141 mmHg  HI                             Diastolic Blood Pressure  75 mmHg                             Mean Arterial Pressure, Cuff              97 mmHg    5/30/2021 3:00 CDT       Respiratory Rate          18 br/min        No qualifying data available   General: + Somewhat unkempt  Eye: PERRLA, EOMI, clear conjunctiva, eyelids normal  HENT: Oropharynx without erythema/exudate, oropharynx and nasal mucosal surfaces moist  Neck: No thyromegaly or lymphadenopathy  Respiratory: clear to auscultation bilaterally  Cardiovascular: regular rate and rhythm without murmurs, gallops or rubs  Gastrointestinal: soft, non-tender, non-distended with normal bowel sounds, without masses to palpation  Integumentary: no rashes or skin lesions present  Neurologic: cranial nerves grossly intact, no signs of gross peripheral neurological deficit, + slowed mentation  Psych: Appropriate affect, not anxious or depressed      Impression and Plan     Adult failure to thrive from recent left basal ganglia CVA   -Aspirin 81 mg daily  -PT, OT, SLP      Essential hypertension  -Continue Cozaar and Norvasc    Vitamin D deficiency  -On  replacement    Dysphagia  -on dysphagia diet    Moderate protein caloric malnutrition  -On dietary supplements    CODE STATUS is full code  VTE prophylaxis is with Lovenox

## 2022-04-30 NOTE — DISCHARGE SUMMARY
Patient:   Bigg Ayala            MRN: [id number]            FIN: [id number]               Age:   75 years     Sex:  Male     :  1945   Associated Diagnoses:   Zenker's diverticulum; Pneumonia, aspiration; Moderate protein malnutrition; FTT (failure to thrive) in adult; Dysphagia; CVA (cerebrovascular accident); Cognitive impairment   Author:   yT Acuña MD      Discharge Information      Discharge Summary Information   Admitting physician     Ty Acuña MD.     Discharge diagnosis     Zenker's diverticulum (JID80-JU K22.5).     Pneumonia, aspiration (SQD41-OX J69.0).     Moderate protein malnutrition (WIT19-XC E44.0).     FTT (failure to thrive) in adult (KAT34-OD R62.7).     Dysphagia (UUS41-QB R13.10).     CVA (cerebrovascular accident) (TLO08-QM I63.9).     Cognitive impairment (BGZ82-KB R41.89).     Discharge medications     OTHER MEDICATIONS (Selected)   Prescriptions  Prescribed  Prevacid 15 mg oral granule: 7.5 mg = 0.5 EA, PEG Tube, Daily, # 45 EA, 11 Refill(s), other reason (Rx)  Provigil 100 mg oral tablet: 200 mg = 2 tab(s), PEG Tube, Daily, # 60 tab(s), 5 Refill(s)  Documented Medications  Documented  Abilify 5 mg oral tablet: 5 mg = 1 tab(s), PEG Tube, Daily, 0 Refill(s)  Centravites Adults oral tablet: 1 tab(s), PEG Tube, 0 Refill(s)  Cozaar 25 mg oral tablet: 50 mg = 2 tab(s), PEG Tube, Daily, 0 Refill(s)  Lexapro 10 mg oral tablet: 10 mg = 1 tab(s), PEG Tube, Daily, 0 Refill(s)  Lipitor 40 mg oral tablet: 40 mg = 1 tab(s), PEG Tube, Daily, 0 Refill(s)  ascorbic acid 500 mg oral tablet: 500 mg = 1 tab(s), PEG Tube, BID, 0 Refill(s)  cholecalciferol 5000 intl units (125 mcg) oral tablet: PEG Tube, Daily, 0 Refill(s)  ferrous sulfate 300 mg/5 mL (60 mg elemental iron) oral liquid: 300 mg = 5 mL, PEG Tube, BID, 0 Refill(s)  zinc oxide 40% topical ointment: 1 melodie, TOP, TID, 0 Refill(s).        Physical Examination      Vital Signs (last 24 hrs)_____  Last  Charted___________  Temp Oral     36.7 DegC  (JUL 21 11:03)  Heart Rate Peripheral   89 bpm  (JUL 21 11:03)  Resp Rate         17 br/min  (JUL 21 11:00)  SBP      H 144mmHg  (JUL 21 11:03)  DBP      78 mmHg  (JUL 21 11:03)  SpO2      95 %  (JUL 21 11:03)     Awake.  Slowed mentation.  Alert.  No distress.      Hospital Course     Is a 75-year-old male with failure to thrive due to left basal ganglia ischemic and chronic malnutrition.  He continues to have dysphagia due to both stroke and chronic Zenker diverticulum which ENT has suggests outpatient follow-up. Patient also had a slow downward trend in his H&H with heme positive stool and a negative EGD.  Aspirin and Lovenox were discontinued and Pt started on iron.  He also received 2 units of blood during his stay.  He is being discharged to nursing home for ongoing care.  Recommendations include follow-up with Dr. Roberts from ENT to evaluate Zenker diverticulum and possible surgical repair.  Also recommend periodic H&H monitoring and if continues to trend down patient would benefit from referral to GI for colonoscopy.         Discharge Plan   Discharge Summary Plan   Discharge Status: improved.     Discharge instructions given: to caregiver.     Discharge disposition: discharge to skilled nursing facility NH.     Prescriptions: reviewed.     Discharge time greater than 30 minutes.

## 2022-04-30 NOTE — CONSULTS
DATE OF CONSULTATION:  07/16/2021    ATTENDING PHYSICIAN:  John Mccarthy MD  CONSULTING PHYSICIAN:  John Mccarthy MD    REASON FOR CONSULTATION:  Possible upper GI bleed, dropping hematocrit, and request for EGD.    HISTORY OF PRESENT ILLNESS:  This is a 75-year-old male admitted in early July of 2021 for a left basal ganglia, stroke, and chronic malnutrition.  He has known dysphagia and Zenker diverticulum.  He does have a PEG tube.  The patient had a prior hemoglobin of 11.6 on July 4th; however, when he had heme positive stools and when the next CBC was checked, it was July 13, 2021, and it had dropped to 8.6, his hemoglobin.  For this reason, there was concern for a GI bleed, and I was consulted for an EGD.    REVIEW OF SYSTEMS:  The patient is weak and sluggish; however, he has no headache, dizziness.  No chest pain.  No abdominal pain or diarrhea.  He does not recall seeing blood in his stools or dark stools but was Hemoccult positive.  No vomiting and no bloody output through the G tube.    PHYSICAL EXAM:  VITAL SIGNS:  Temperature is 36.8 degrees centigrade, 80 beats per minute, 18 respirations per minute, 130/79 his blood pressure, and he saturates 98% on room air.   GENERAL:  He is awake and alert, oriented to self and location.   HEENT:  He is normocephalic, atraumatic.  Pupils are equal, round, and reactive to light and accommodation.   NECK:  There is no thyromegaly.  No goiter.  Trachea is midline.  No cervical lymphadenopathy.   HEART:  Regular rate and rhythm.  No rubs, murmurs, or gallops.   CHEST:  Clear to auscultation bilaterally.  No wheezes, rales, or rhonchi.   ABDOMEN:  Soft, nontender, nondistended.  Bowel sounds are present.  PEG tube is in his epigastrium with no leaking around it.    MEDICATIONS:  Please see med rec.    ALLERGIES:  He has no known allergies.    ASSESSMENT AND PLAN:  A 75-year-old male with dysphagia, weakness, and Hemoccult-positive stools with suspicion for  gastrointestinal bleed.  I will be happy to perform an EGD for him.       Thank you for the consultation.  My cell phone number is [phone].  Please do not hesitate to contact me for the care of this patient or any other patient in need of general bariatric surgical care.        ______________________________  John Mccarthy MD RA/SK  DD:  07/16/2021  Time:  06:49AM  DT:  07/16/2021  Time:  07:12AM  Job #:  [id number]

## 2022-04-30 NOTE — H&P
"   Patient:   Bigg Ayala            MRN: [id number]            FIN: [id number]               Age:   75 years     Sex:  Male     :  1945   Associated Diagnoses:   None   Author:   Ty Acuña MD      Basic Information     75-year-old male with no known medical problems (but has not seen a provider in over 10 years) was brought in by friends for what sounds like progressive failure to thrive and progressive poor p.o. intake with possible alcohol overuse.  Patient has slowed mentation but is otherwise alert and oriented and just complains of may be feeling a little weak.  He is really not sure why his friends brought him here.  He lives in a trailer behind a friend's house.  Does not sound like he has been eating much lately and says he has not eaten anything in the past 2 days.  Denies having poor access to food (but this may be the case).  CT scan of brain showed a hypodensity in the left basal ganglia which may represent a subacute to acute stroke.      Chief Complaint   2021 10:41 CDT      brought by friend for "not eating" x 2 weeks. Friend told security pt had left sided weakness and numbness with trouble walking. Pt states friend brought hiim here because he hasn't eaten in 2 weeks because don't feel like eating. Fell 2 days ago No LOC        Review of Systems   Positive for weakness and fatigue.  Possible weight loss of an unspecified amount.  10 systems reviewed and negative except as noted.      Health Status   Allergies:    Allergic Reactions (Selected)  No Known Allergies,    Allergies (1) Active Reaction  No Known Allergies None Documented     Current medications:  (Selected)   Inpatient Medications  Ordered  Ambien 5 mg oral tablet: 5 mg, form: Tab, Oral, At Bedtime PRN for insomnia, first dose 21 17:07:00 CDT, May repeat 30 minutes later once per evening if initial 5mg dose ineffective  Dilaudid: 1 mg, form: Injection, IV Push, q4hr PRN for pain, severe, first dose " 05/25/21 17:07:00 CDT, User for severe pain or if Pt is NPO with any pain  Dulcolax Laxative 5 mg ORAL enteric coated tablet: 10 mg, form: Tab-EC, Oral, Daily PRN for constipation, first dose 05/25/21 17:07:00 CDT  Norco  10 mg-325 mg oral tablet: 1 tab(s), form: Tab, Oral, q4hr PRN for pain, first dose 05/25/21 17:07:00 CDT  Tessalon Perles: 200 mg, form: Cap, Oral, q8hr PRN for cough, first dose 05/25/21 17:07:00 CDT  Tylenol 325 mg oral tablet: 650 mg, form: Tab, Oral, q4hr PRN for fever, first dose 05/25/21 17:07:00 CDT, > 38°C (100.4°F)  Tylenol 325 mg oral tablet: 650 mg, form: Tab, Oral, q4hr PRN for pain, mild, first dose 05/25/21 17:07:00 CDT  Zofran 2 mg/mL injectable solution: 4 mg, form: Injection, IV, q4hr PRN for nausea/vomiting, Infuse over: 20 minute(s), first dose 05/25/21 17:07:00 CDT  Zofran ORAL tab: 8 mg, form: Tab, Oral, QID PRN for nausea/vomiting, first dose 05/25/21 17:07:00 CDT, If no IV access  aspirin 81 mg oral Delayed Release (EC) tablet: 81 mg, form: Tab-EC, Oral, Daily, first dose 05/26/21 9:00:00 CDT  aspirin 81 mg oral tablet, CHEWABLE: 81 mg, form: Tab-Chew, Oral, Daily, first dose 05/26/21 9:00:00 CDT  cloniDINE 0.1 mg oral tablet: 0.1 mg, form: Tab, Oral, QID PRN for hypertension, first dose 05/25/21 17:07:00 CDT, NOW, PRN SBP > 160  diphenhydrAMINE  IV Push: 25 mg, form: Injection, IV, q4hr PRN for itching, first dose 05/25/21 17:07:00 CDT, Use if itching and Pt is NPO  diphenhydrAMINE  ORAL: 25 mg, form: Cap, Oral, q4hr PRN for itching, first dose 05/25/21 17:07:00 CDT  hydrALAZINE 20 mg/mL injectable solution: 10 mg, form: Injection, IV, q3hr PRN for hypertension, first dose 05/25/21 17:07:00 CDT, SBP>160, use if NPO or if PRN Clonidine does npot reduce SBP to <160  Prescriptions  Prescribed  potassium chloride 20 mEq oral TABLET extended release: 20 mEq = 1 tab(s), Oral, BID, # 14 tab(s), 0 Refill(s)   Problem list:    All Problems  Tobacco user / SNOMED CT [id number] /  Probable,    Active Problems (1)  Tobacco user         Histories   Past Medical History:    Resolved  Alcoholism ([id number]):  Resolved.   Family History:    No family history items have been selected or recorded.   Procedure history:    No active procedure history items have been selected or recorded.   Social History        Social & Psychosocial Habits    Alcohol  07/03/2017  Use: Current    Type: Beer    Frequency: 1-2 times per week    Substance Use  05/25/2021  Use: Never    Tobacco  07/03/2017  Use: Current every day smoker    Type: Cigarettes    05/25/2021  Use: 10 or more cigarettes (1/    Type: Cigarettes    Patient Wants Consult For Cessation Counseling No    Abuse/Neglect  05/25/2021  SHX Any signs of abuse or neglect No  .     Past medical history unknown, family history unknown.      Physical Examination   Vital Signs   5/25/2021 15:33 CDT      Peripheral Pulse Rate     72 bpm                             Respiratory Rate          18 br/min                             SpO2                      100 %                             Oxygen Therapy            Room air                             Systolic Blood Pressure   165 mmHg  HI                             Diastolic Blood Pressure  85 mmHg    5/25/2021 10:41 CDT      Temperature Oral          36.8 DegC                             Temperature Oral (calculated)             98.24 DegF        Vital Signs (last 24 hrs)_____  Last Charted___________  Weight      57 kg  (MAY 25 16:27)  Height      184 cm  (MAY 25 16:27)  BMI      16.84  (MAY 25 16:27)     General: + Somewhat unkempt  Eye: PERRLA, EOMI, clear conjunctiva, eyelids normal  HENT: Oropharynx without erythema/exudate, oropharynx and nasal mucosal surfaces moist  Neck: No thyromegaly or lymphadenopathy  Respiratory: clear to auscultation bilaterally  Cardiovascular: regular rate and rhythm without murmurs, gallops or rubs  Gastrointestinal: soft, non-tender, non-distended with normal bowel sounds,  without masses to palpation  Integumentary: no rashes or skin lesions present  Neurologic: cranial nerves grossly intact, no signs of gross peripheral neurological deficit, + slowed mentation  Psych: Appropriate affect, not anxious or depressed      Review / Management   Radiology results   Rad Results (ST)   Accession: [id number]  Order: US Carotid  Report Dt/Tm: 05/25/2021 14:42  Report:   EXAMINATION  US Carotid     TECHNIQUE  Multiplanar grayscale sonographic evaluation and color/spectral  Doppler interrogation of the bilateral extracranial carotid and  vertebral arteries.  Sonographic NASCET Index criteria utilized to quantify degree of  stenosis, when present.     INDICATION  CVA     Comparison: None available     FINDINGS  Exam quality: Adequate for evaluation.     Scattered, relatively mild appearance of mural plaquing and echogenic  calcification appreciated at the left carotid bifurcation. Otherwise,  there is no significant plaque or evidence of intimal thickening.     Right ICA Velocities  Proximal: 60 cm/s peak systolic; 12 cm/s end diastolic  Mid: 58 cm/s PSV; 11 cm/s EDV  Distal: 47 cm/s PSV; 10 cm/s EDV     Left ICA Velocities  Proximal: 49 cm/s PSV; 10 cm/s EDV  Mid: 75 cm/s PSV; 12 cm/s EDV  Distal: 78 cm/s PSV; 15 cm/s EDV     ICA to CCA PSV ratios  Right = 1.5  Left = 2.1     Low-resistance spectral waveform morphology is maintained throughout  the bilateral internal carotid and vertebral artery systems.  Antegrade flow is seen in the vertebral arteries bilaterally. However,  there is somewhat resistive appearance of the left vertebral artery  spectral waveform.     The visualized bilateral external carotid artery segments are  unremarkable in appearance and Doppler assessment.  The visualized soft tissues are unremarkable.     IMPRESSION  1.  Minimal left carotid bifurcation atherosclerotic plaquing, with no  evidence of severe plaque burden or focal vessel abnormality.  2.  No hemodynamically  significant stenosis (by velocity criteria).  3.  Mildly resistive appearance of left vertebral artery waveform may  be artifactual, although element of vertebral artery stenosis is not  entirely excluded by limited assessment.          Impression and Plan     Adult failure to thrive possibly from recent left basal ganglia CVA and/or alcoholism  -Aspirin 81 mg daily  -Check cholesterol  -MRI brain  -Carotid ultrasound  -Cardiac echo  -Fasting lipid level  -PT, OT, SLP (for cognition)   -Calorie count  -Prealbumin level  -TSH  -UDS    Elevated blood pressure  -No history of hypertension but patient has not gotten good primary care so unknown if he has a hypertensive patient  -As needed clonidine and as needed labetalol and may need to start routine meds    Possible alcoholism  -P.o. Librium as needed alcohol withdrawal  -Check B12 and folic acid levels    CODE STATUS is full code  T prophylaxis is with early ambulation as patient is at low risk

## 2022-04-30 NOTE — OP NOTE
Patient:   Bigg Ayala            MRN: [id number]            FIN: [id number]               Age:   75 years     Sex:  Male     :  1945   Associated Diagnoses:   None   Author:   John Mccarthy MD      Brief Operative Note   Operative Information   Date/ Time:  2021 07:17:00.     Procedures Performed: EGD.     Surgeon: John Mccarthy MD.     Speciman Removed: none.     Esimated blood loss: No blood loss.     Description of Procedure/Findings/    Complications: 1.  no esophageal, gastric, or duodenal bleeding, ulceration, or old blood.   2. zenkers diverticulum,  3. 5cm hiatal hernia  .

## 2022-04-30 NOTE — OP NOTE
DATE OF SURGERY:    07/16/2021    SURGEON:  John Mccarthy MD    PROCEDURE PERFORMED:  Esophagogastroduodenoscopy.    INDICATIONS:  This is a 75-year-old male who has and steadily dropping hematocrit with Hemoccult-positive stools.  I was consulted for EGD for possible upper GI source.    PREOPERATIVE DIAGNOSIS:  Anemia.    POSTOPERATIVE DIAGNOSES:    1. Anemia.  2. Zenker's diverticulum.  3. Hiatal hernia.    ANESTHESIA:  MAC.    SPECIMENS:  No specimens removed.    FINDINGS:    1. Zenker's diverticulum noted.  2. 5 cm hiatal hernia.  3. Normal esophageal, gastric and duodenal mucosa.  4. No evidence of gastrointestinal bleed or ulceration.    COMPLICATIONS:  None.    PROCEDURE:  Patient was brought to the operating room and laid supine on the operating room table.  A bite block was placed in his mouth.  Monitored anesthesia was delivered.  He was placed in the left lateral decubitus position.  An enteroscope was advanced through his mouth and in the pharynx we did note that the patient was known to have a Zenker's diverticulum.  We did indeed find it and the scope preferentially passed in the diverticulum versus the lumen of the esophagus.  However, after some maneuvering, I was able to intubate the distal esophagus; however, the esophageal mucosa appeared normal.  There was no esophagitis or evidence of bleeding.  It was quite patulous, but no evidence of GI bleeding.  The scope was advanced into the stomach.  There was no gastritis.  There was no evidence of ulceration or old blood within the stomach at all.  The bulb of the gastrostomy tube was located and there was no bleeding around it either.  The pylorus was intubated and the first and second portions of the duodenum were examined.  There was no blood within the duodenum either and the mucosa appeared normal.  All-in-all, other than the Zenker diverticulum and the hiatal hernia, it seemed to be normal EGD.  It is unlikely that the patient has upper  GI bleed.  The patient tolerated the procedure well and will be brought to postanesthesia care unit in stable satisfactory condition.        ______________________________  John Mccarthy MD RA/DANYEL  DD:  07/16/2021  Time:  07:17AM  DT:  07/16/2021  Time:  07:31AM  Job #:  [id number]

## 2022-05-03 NOTE — HISTORICAL [facility] CERNER
This is a historical note converted from Reggie. Formatting and pictures may have been removed.  Please reference Reggie for original formatting and attached multimedia. Chief Complaint  Blood in PEG tube  History of Present Illness  Patient is a 75-year-old?male with past history of left basal ganglia stroke, cognitive impairment possibly?due to alcohol abuse,?malnutrition and failure to thrive who was originally admitted as observation?for hypokalemia failure to thrive and blood in PEG tube.? During his observation admission hyper?bow kalemia was corrected however patient developed?hyperkalemia due to overcorrection.? He also had?a GI bleed?with positive FOBT?and?was put on IV Protonix with H&H stable.? He had?been hospitalized for a lengthy stay over the summer?and?did have a?slow GI bleed at that time that was worked up with endoscopy and showed only Zenker diverticulum which ENT evaluated and deemed to be nonoperative.? Patient has not had colonoscopy however.? His observation admission was complicated by extreme agitation as patient pulled PICC line out?attempted to get out of bed and fell and would not allow?administration of medications via PEG tube.? Given?continued ongoing issues and failure of safe discharge patient is being admitted to acute?for management of hyperkalemia?prolonged GI bleed?that is chronic?and?agitation as well as failure to thrive dysphagia?and?increased need for assistance with ADLs.? Patient had been living with friend Gabino?at home however?he?may need to be discharged to nursing home?as he reports that?he does not feel safe with Gabino.  Regarding the chief complaint its location is diffuse severity severe duration is days timing progressive  Review of Systems  Attempted not able to reliably obtain  Physical Exam  Vitals & Measurements  T:?36.6? ?C (Oral)? TMIN:?36.5? ?C (Oral)? TMAX:?36.7? ?C (Oral)? HR:?90(Peripheral)? RR:?17? BP:?119/71? SpO2:?98%?  ?  GENERAL:?Frail,  cachectic  HEAD:?Temporal wasting  EYES: ?Pupils are equal. ?Extraocular motions intact. ?  EARS: ?Hearing grossly intact.  MOUTH: ?Oropharynx is normal.?  NECK: ?No adenopathy, no JVD. ??  CHEST: ?Chest with clear breath sounds bilaterally. ?No wheezes, rales, or rhonchi. ?  CARDIAC: ?Regular rate and rhythm. ?S1 and S2, without murmurs, gallops, or rubs.  VASCULAR: ?No Edema. ?Peripheral pulses normal and equal in all extremities.  ABDOMEN: ?soft, NT + BS?PEG tube in place  MUSCULOSKELETAL:?Cachectic extremities  NEUROLOGIC EXAM:?Somnolent but arousable?very flat affect and poor insight  PSYCHIATRIC: ?Mood normal.  SKIN: ?No rash or lesions.  ?  Assessment/Plan  1.?Hyperkalemia?E87.5  ?This will be treated with 1 dose of Kayexalate via PEG  2.?Agitation?R45.1  ?Patient will be put on standing Seroquel via PEG,?should this fail?can consider IV Ativan as needed however?risk of falls?need to be taken into account given his impulsiveness and proclivity?to falls  3.?GI bleed?K92.2  ?This is slow and chronic he is on IV Protonix he has had endoscopy?may benefit from colonoscopy to complete work-up?this can be done?once the above issues?stabilized  4.?Dysphagia?R13.10  ?Is ongoing patient will require?continued n.p.o. status and tube feeds  5.?Malnutrition?E46  ?Continue nutritional support  6.?History of CVA with residual deficit?I69.30  ?Patient is on a statin?aspirin on hold given GI bleed  7.?Zenkers diverticulum?K22.5  ?This is nonoperative per ENT likely contributing to dysphagia  Orders:  enoxaparin, 40 mg, form: Injection, Subcutaneous, Daily, first dose 09/23/21 9:00:00 CDT  OLANZapine, 2.5 mg, form: Injection, IM, Once-Unscheduled PRN for agitation, first dose 09/22/21 19:41:00 CDT, NOW  QUEtiapine, 25 mg, form: Tab, PEG Tube, TID, first dose 09/23/21 14:00:00 CDT  sodium polystyrene sulfonate, 30 gm, form: Susp, PEG Tube, Once, first dose 09/23/21 11:58:00 CDT, stop date 09/23/21 11:58:00 CDT  1:1 Observation,  09/22/21 16:03:00 CDT, Constant Order, [facility] Patient Observer if appropriate per policy  Discharge, 09/23/21 10:57:00 CDT, Post-Acute Services/Facilities, DC to Acute  MD to Nurse, 09/22/21 22:32:00 CDT, OK TO HAVE ICE CHIPS  Tube Feeding, 09/21/21 16:23:00 CDT, Nutren 2.0, 45 ccs/hour, H2O Flush 250 ccs/q6h  Wound Care, 09/22/21 18:08:00 CDT, q3day, Apply sacral foam dressing q 3 days and prn for prevention   Problem List/Past Medical History  Ongoing  Anemia  Chronic anemia  Failure to thrive  Hiatal hernia  HTN (hypertension)  Tobacco user  Wheelchair  Historical  Alcoholism  CVA - Cerebrovascular accident  Procedure/Surgical History  Esophagogastroduodenoscopy (07/16/2021)  Esophagogastroduodenoscopy, flexible, transoral; diagnostic, including collection of specimen(s) by brushing or washing, when performed (separate procedure) (07/16/2021)  Inspection of Upper Intestinal Tract, Via Natural or Artificial Opening Endoscopic (07/16/2021)  Esophagogastroduodenoscopy, flexible, transoral; with directed placement of percutaneous gastrostomy tube (06/11/2021)  Insertion of Feeding Device into Stomach, Percutaneous Approach (06/11/2021)  PEG Tube Insertion Initial (06/11/2021)  Insertion of Infusion Device into Right Basilic Vein, Percutaneous Approach (06/05/2021)  Excision of Toe Nail, External Approach (05/26/2021)  Excision of Toe Nail, External Approach (05/26/2021)  Excision of Toe Nail, External Approach (05/26/2021)  Excision of Toe Nail, External Approach (05/26/2021)  Excision of Toe Nail, External Approach (05/26/2021)  Excision of Toe Nail, External Approach (05/26/2021)  Excision of Toe Nail, External Approach (05/26/2021)  Excision of Toe Nail, External Approach (05/26/2021)  Excision of Toe Nail, External Approach (05/26/2021)  Excision of Toe Nail, External Approach (05/26/2021)  Trimming of nondystrophic nails, any number (05/26/2021)  Application of short arm splint (forearm to hand); static  (07/03/2017)   Medications  Inpatient  Abilify 5 mg oral tablet, 5 mg= 1 tab(s), PEG Tube, Daily  ascorbic acid 500 mg oral tablet, 500 mg= 1 tab(s), PEG Tube, BID  cholecalciferol 1000 intl units (25 mcg) oral tablet, 1000 units= 1 tab(s), PEG Tube, Daily  Cozaar, 50 mg= 2 tab(s), PEG Tube, Daily  Effer-K 20 mEq oral tablet, effervescent, 40 mEq= 2 tab(s), Oral, q6hr  ferrous sulfate 300 mg/5 mL oral liquid, 300 mg= 5 mL, PEG Tube, BID  Kayexalate, 30 gm= 120 mL, PEG Tube, Once  Lexapro 10 mg oral tablet, 10 mg= 1 tab(s), PEG Tube, Daily  lidocaine 1% injectable solution, 5 mL, Subcutaneous, Once  Lipitor 40 mg oral tablet, 40 mg= 1 tab(s), PEG Tube, Daily  Lovenox, 40 mg= 0.4 mL, Subcutaneous, Daily  Protonix, 40 mg= 1 EA, IV Slow, Daily  Provigil 100 mg oral tablet, 200 mg= 2 tab(s), PEG Tube, Daily  Seroquel 25 mg oral tablet, 25 mg= 1 tab(s), PEG Tube, TID  Sodium Chloride 0.9% PF vial (For PICC Flush), 10 mL, IV Push, As Directed, PRN  Sodium Chloride 0.9% PF vial (For PICC Flush), 20 mL, IV Push, As Directed, PRN  Sodium Chloride 0.9% PF vial (For PICC Flush), 10 mL, IV Push, q12hr  zinc oxide 40% topical ointment, 1 melodie, TOP, TID  ZyPREXA, 2.5 mg= 0.25 EA, IM, Once-Unscheduled, PRN  Home  Abilify 5 mg oral tablet, 5 mg= 1 tab(s), PEG Tube, Daily,? ?Unable to obtain  ascorbic acid 500 mg oral tablet, 500 mg= 1 tab(s), PEG Tube, BID,? ?Unable to obtain  Centravites Adults oral tablet, 1 tab(s), PEG Tube,? ?Unable to obtain  cholecalciferol 5000 intl units (125 mcg) oral tablet, PEG Tube, Daily,? ?Unable to obtain  Cozaar 25 mg oral tablet, 50 mg= 2 tab(s), PEG Tube, Daily,? ?Unable to obtain  ferrous sulfate 300 mg/5 mL (60 mg elemental iron) oral liquid, 300 mg= 5 mL, PEG Tube, BID,? ?Unable to obtain  Lexapro 10 mg oral tablet, 10 mg= 1 tab(s), PEG Tube, Daily,? ?Unable to obtain  Lipitor 40 mg oral tablet, 40 mg= 1 tab(s), PEG Tube, Daily,? ?Unable to obtain  Prevacid 15 mg oral granule, 7.5 mg= 0.5 EA, PEG  Tube, Daily, 11 refills,? ?Unable to obtain  Provigil 100 mg oral tablet, 200 mg= 2 tab(s), PEG Tube, Daily, 5 refills,? ?Unable to obtain  zinc oxide 40% topical ointment, 1 melodie, TOP, TID,? ?Unable to obtain  Allergies  No Known Allergies  Social History  Abuse/Neglect  No, Yes, Yes, 09/21/2021  No, 09/21/2021  Alcohol  Current, Beer, Liquor, Daily, 05/27/2021  Current, Beer, 1-2 times per week, 07/03/2017  Substance Use  Never, 05/25/2021  Tobacco  Smoker, current status unknown, No, Household tobacco concerns: No., 09/21/2021  Never (less than 100 in lifetime), No, 09/21/2021  Immunizations  Vaccine Date Status   COVID-19 mRNA, LNP-S, PF - Moderna 05/25/2021 Recorded   Lab Results  Reviewed  Diagnostic Results  Reviewed

## 2022-05-03 NOTE — HISTORICAL [facility] C[facility]ER
This is a historical note converted from Reggie. Formatting and pictures may have been removed.  Please reference Reggie for original formatting and attached multimedia. Chief Complaint  Blood in PEG tube  History of Present Illness  Patient is a 75-year-old male?with past history of left basal ganglia stroke,?cognitive impairment possibly due to alcohol abuse?malnutrition and failure to thrive who is coming in?with?blood in the PEG tube brought in by caregiver.? Patient was admitted to this hospital over the summer for lengthy hospitalization?after?a left?basal ganglia stroke?as well as?prolonged failure to thrive. ?Patient was not able to care for himself and actually was not able to take in sufficient p.o. intake?and?was?discharged to a nursing home on tube feedings.? He was?cared for at the nursing home until a couple of weeks ago when his friend?Gabino took him?to his house where he has been cared for there.? Patients caregiver brought patient to the emergency room after noticing blood in the PEG tube.? In the emergency room work-up was notable for positive FOBT?as well as?a serum potassium of 2.2.? Caregiver did report understanding of?tube feedings and?reported administering them 3 times daily.? Patient was given IV?potassium?via PICC as well as?dissolvable potassium via PEG?and his potassium has since corrected this morning.? He is admitted for?hypokalemia?as well as?for?GI bleed?and failure to thrive.  Patient remains?minimally verbal with flat affect and poor insight and unable to provide?much history  Guarding the chief complaint its location is in the PEG tube?its severity is unknown its timing duration are unknown  Physical Exam  Vitals & Measurements  T:?37? ?C (Oral)? TMIN:?36.5? ?C (Oral)? TMAX:?37? ?C (Oral)? HR:?68(Peripheral)? RR:?18? BP:?118/70? SpO2:?97%? WT:?54?kg? BMI:?16.16?  ?  Attempted not able to reliably obtain  ?  Assessment/Plan  1.?Hypokalemia?E87.6  ?This has been repleted suspected in  the setting of?insufficient?dietary intake?we will continue to monitor and replete as needed  2.?GI bleed?K92.2  ?Patient has had a prior history of GI bleed?and was recommended on discharge to consider GI follow-up?for question of colonoscopy he did have an endoscopy done?for the same issue which showed only Zenkers diverticulum.? 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  ?This has been evaluated by ENT and per my understanding deemed not to be?operative candidate  Orders:  citric acid-potassium bicarbonate, 40 mEq, form: Tab-Eff, Oral, q6hr, first dose 09/21/21 22:00:00 CDT  Lidocaine inj., 5 mL, form: Injection, Subcutaneous, Once, first dose 09/21/21 22:00:00 CDT, stop date 09/21/21 22:00:00 CDT, for PICC insertion  sodium chloride, 10 mL, form: Injection, IV Push, q12hr, first dose 09/21/21 22:00:00 CDT, Flush each lumen following hospital policy for other flushing guidelines  sodium chloride, 20 mL, form: Injection, IV Push, As Directed PRN for other (see comment), first dose 09/21/21 21:18:00 CDT, following administrations of IV medications and/or blood sampling  sodium chloride, 10 mL, form: Injection, IV Push, As Directed PRN for other (see comment), first dose 09/21/21 21:18:00 CDT, prior to administering IV meds and/or blood sampling  Sodium Chloride 0.9% with KCl 20 mEq/L intravenous solution 1,000 mL, 1,000 mL, 1,000 mL, IV, 75 mL/hr,  start date 09/21/21 20:47:00 CDT, 1.71, m2  CBC w/ Auto Diff, Stat collect, 09/22/21 10:36:00 CDT, Blood, Stop date 09/22/21 10:36:00 CDT, Lab Collect, 09/22/21 10:36:00 CDT  Central Line Dressing Change, 09/21/21 21:18:00 CDT, qSunday, Change PICC dressing weekly on Sunday; No gauze.  Comprehensive Metabolic Panel, Stat collect, 09/22/21 10:36:00 CDT, Blood, Stop date 09/22/21 10:37:00 CDT, Lab Collect, 09/22/21 10:36:00 CDT  MD to Nurse, 09/21/21 21:18:00 CDT, DO NOT use syringes smaller than 10 mL. Use a 10 mL or larger syringe for all flushes, IV pushes and lab draws  MD to Nurse, 09/21/21 21:18:00 CDT, Once, STAT Portable Chest 1 View for PICC placement after insertion, 09/21/21 21:18:00 CDT  MD to Nurse, 09/21/21 21:18:00 CDT, No blood pressures or venipunctures on the affected extremity  MD to Nurse, 09/21/21 21:18:00 CDT, May use PICC line to draw labs ONLY with a physician order  MD to Nurse, 09/21/21 21:18:00 CDT, DO NOT INFUSE DILANTIN THROUGH PICC (It will crystallize and clot the line). OK to infuse Cerebyx and Kepptiara MARTINEZ to Nurse, 09/21/21 21:18:00 CDT, May use PICC line for radiology power injections  MD to Nurse, 09/21/21 21:18:00 CDT, Document external length of catheter after each dressing change  MD to Nurse, 09/21/21 21:18:00 CDT, Apply warm moist heat packs for 20 minutes four times daily and elevate extremity as needed for redness or tenderness. Notify physician if symptoms worsen or do not resolve  MD to Nurse, 09/21/21 21:17:00 CDT, ok for pt to get piccline  MD to Nurse, 09/21/21 21:18:00 CDT, qSunday, Change Statlock, chlorhexidine impregnated gauze, and positive pressure caps with each dressing change  MD to Nurse, 09/21/21 21:18:00 CDT, Change PICC dressing PRN for soiled, damaged or curled dressings  MD to Nurse, 09/21/21 21:18:00 CDT, May use PICC line after radiologist or other physician confirms tip placement over the SVC or atriocaval junction  MD to Nurse, 09/21/21 21:18:00 CDT,  Okay to use PICC for CVP monitoring  Tube Feeding, 09/21/21 16:23:00 CDT, Nutren 2.0, 45 ccs/hour  Disposition is acute CODE STATUS is full prophylaxis is Lovenox   Problem List/Past Medical History  Ongoing  Anemia  Chronic anemia  Failure to thrive  Hiatal hernia  HTN (hypertension)  Tobacco user  Wheelchair  Historical  Alcoholism  CVA - Cerebrovascular accident  Procedure/Surgical History  Esophagogastroduodenoscopy (07/16/2021)  Esophagogastroduodenoscopy, flexible, transoral; diagnostic, including collection of specimen(s) by brushing or washing, when performed (separate procedure) (07/16/2021)  Inspection of Upper Intestinal Tract, Via Natural or Artificial Opening Endoscopic (07/16/2021)  Esophagogastroduodenoscopy, flexible, transoral; with directed placement of percutaneous gastrostomy tube (06/11/2021)  Insertion of Feeding Device into Stomach, Percutaneous Approach (06/11/2021)  PEG Tube Insertion Initial (06/11/2021)  Insertion of Infusion Device into Right Basilic Vein, Percutaneous Approach (06/05/2021)  Excision of Toe Nail, External Approach (05/26/2021)  Excision of Toe Nail, External Approach (05/26/2021)  Excision of Toe Nail, External Approach (05/26/2021)  Excision of Toe Nail, External Approach (05/26/2021)  Excision of Toe Nail, External Approach (05/26/2021)  Excision of Toe Nail, External Approach (05/26/2021)  Excision of Toe Nail, External Approach (05/26/2021)  Excision of Toe Nail, External Approach (05/26/2021)  Excision of Toe Nail, External Approach (05/26/2021)  Excision of Toe Nail, External Approach (05/26/2021)  Trimming of nondystrophic nails, any number (05/26/2021)  Application of short arm splint (forearm to hand); static (07/03/2017)   Medications  Inpatient  0.9NaCl w/KCl 20mEq/L 1,000 mL, 1000 mL, IV  Abilify 5 mg oral tablet, 5 mg= 1 tab(s), PEG Tube, Daily  ascorbic acid 500 mg oral tablet, 500 mg= 1 tab(s), PEG Tube, BID  cholecalciferol 1000 intl units (25 mcg) oral  tablet, 1000 units= 1 tab(s), PEG Tube, Daily  Cozaar, 50 mg= 2 tab(s), PEG Tube, Daily  Effer-K 20 mEq oral tablet, effervescent, 40 mEq= 2 tab(s), Oral, q6hr  ferrous sulfate 300 mg/5 mL oral liquid, 300 mg= 5 mL, PEG Tube, BID  Lexapro 10 mg oral tablet, 10 mg= 1 tab(s), PEG Tube, Daily  lidocaine 1% injectable solution, 5 mL, Subcutaneous, Once  Lipitor 40 mg oral tablet, 40 mg= 1 tab(s), PEG Tube, Daily  Protonix, 40 mg= 1 EA, IV Slow, Daily  Provigil 100 mg oral tablet, 200 mg= 2 tab(s), PEG Tube, Daily  Sodium Chloride 0.9% PF vial (For PICC Flush), 10 mL, IV Push, As Directed, PRN  Sodium Chloride 0.9% PF vial (For PICC Flush), 20 mL, IV Push, As Directed, PRN  Sodium Chloride 0.9% PF vial (For PICC Flush), 10 mL, IV Push, q12hr  zinc oxide 40% topical ointment, 1 melodie, TOP, TID  Home  Abilify 5 mg oral tablet, 5 mg= 1 tab(s), PEG Tube, Daily,? ?Unable to obtain  ascorbic acid 500 mg oral tablet, 500 mg= 1 tab(s), PEG Tube, BID,? ?Unable to obtain  Centravites Adults oral tablet, 1 tab(s), PEG Tube,? ?Unable to obtain  cholecalciferol 5000 intl units (125 mcg) oral tablet, PEG Tube, Daily,? ?Unable to obtain  Cozaar 25 mg oral tablet, 50 mg= 2 tab(s), PEG Tube, Daily,? ?Unable to obtain  ferrous sulfate 300 mg/5 mL (60 mg elemental iron) oral liquid, 300 mg= 5 mL, PEG Tube, BID,? ?Unable to obtain  Lexapro 10 mg oral tablet, 10 mg= 1 tab(s), PEG Tube, Daily,? ?Unable to obtain  Lipitor 40 mg oral tablet, 40 mg= 1 tab(s), PEG Tube, Daily,? ?Unable to obtain  Prevacid 15 mg oral granule, 7.5 mg= 0.5 EA, PEG Tube, Daily, 11 refills,? ?Unable to obtain  Provigil 100 mg oral tablet, 200 mg= 2 tab(s), PEG Tube, Daily, 5 refills,? ?Unable to obtain  zinc oxide 40% topical ointment, 1 melodie, TOP, TID,? ?Unable to obtain  Allergies  No Known Allergies  Social History  Abuse/Neglect  No, Yes, Yes, 09/21/2021  No, 09/21/2021  Alcohol  Current, Beer, Liquor, Daily, 05/27/2021  Current, Beer, 1-2 times per week,  07/03/2017  Substance Use  Never, 05/25/2021  Tobacco  Smoker, current status unknown, No, Household tobacco concerns: No., 09/21/2021  Never (less than 100 in lifetime), No, 09/21/2021  Immunizations  Vaccine Date Status   COVID-19 mRNA, LNP-S, PF - Moderna 05/25/2021 Recorded   Lab Results  Reviewed  Diagnostic Results  Reviewed discussed care with emergency room physician

## 2022-05-03 NOTE — HISTORICAL [facility] C[facility]
This is a historical note converted from Reggie. Formatting and pictures may have been removed.  Please reference Reggie for original formatting and attached multimedia. 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  Immunizations  No immunizations recorded for this visit.  Admission Information  Patient is a 75-year-old male?with past history of left basal ganglia stroke,?cognitive impairment possibly due to alcohol abuse?malnutrition and failure to thrive who is coming in?with?blood in the PEG tube brought in by caregiver.? Patient was admitted to this hospital over the summer for lengthy hospitalization?after?a left?basal ganglia stroke?as well as?prolonged failure to thrive. ?Patient was not able to care for himself and actually was not able to take in sufficient p.o. intake?and?was?discharged to a nursing home on tube feedings.? He was?cared for at the nursing home until a couple of weeks ago when his friend?Gabino took him?to his house where he has been cared for there.? Patients caregiver brought patient to the emergency room after noticing blood in the PEG tube.? In the emergency room work-up was notable for positive FOBT?as well as?a serum potassium of 2.2.? Caregiver did report understanding of?tube feedings and?reported administering them 3 times daily.? Patient was given IV?potassium?via PICC as well as?dissolvable potassium via PEG?and his potassium has since corrected this  morning.? He is admitted for?hypokalemia?as well as?for?GI bleed?and failure to thrive. [1]  Hospital Course  Patient is a 75-year-old male with past history of CVA?with dysphagia status post PEG,?malnutrition and failure to thrive, prior history of?GI bleed?with?endoscopy remarkable for only?Zenker diverticulum?who was admitted?from home with blood in PEG tube?and serum potassium of 2.2.? Patients potassium?was repleted?with IV?potassium?and?oral potassium via PEG,?however?patient?has required ongoing care?for multiple?medical issues?including a potassium?of 5.9?due to overcorrection,?recurrent agitation?as he has?been impulsive and attempted to?get out of bed?had a fall?and pulled out PICC line,?and recurrent GI?bleed?thought to be a slow bleed?as?H&H have been stable.? Patient is therefore being discharged to acute?given failure of?observation therapy to fully resolve ongoing medical issues.? He will?receive care for?hyperkalemia?agitation?and GI bleed.  Significant Findings  Hyperkalemia positive FOBT  Time Spent on discharge  More than 30 minutes  Objective  Vitals & Measurements  T:?36.6? ?C (Oral)? TMIN:?36.5? ?C (Oral)? TMAX:?36.7? ?C (Oral)? HR:?90(Peripheral)? RR:?17? BP:?119/71? SpO2:?98%?  Physical Exam  Patient is frail he is cachectic?he is impulsive?he has poor insight into his medical condition?vital signs are stable however  Patient Discharge Condition  Frail  Discharge Disposition  Acute   Discharge Medication Reconciliation  Discharge Med Rec is not complete  Education and Orders Provided  Anemia  Hypertension, Adult  Hypokalemia  Discharge - 09/23/21 10:57:00 CDT, Post-Acute Services/Facilities, PR to Acute?  Follow up  Patient is followed by [facility] [phone]  Jeanie Elias, within 1 week  Car Seat Challenge  No Qualifying Data     [1]?Admission H & P; Kishor Andujar MD 09/22/2021 12:23 CDT

## 2022-05-03 NOTE — HISTORICAL [facility] C[facility]
This is a historical note converted from Reggie. Formatting and pictures may have been removed.  Please reference Reggie for original formatting and attached multimedia. Admit and Discharge Dates  Admit Date: 05/25/2021  Discharge Date: 05/27/2021  Physicians  Attending Physician - Arianne MARTINEZ, Ty ESPINOZA  Admitting Physician - Arianne MARTINEZ, Ty ESPINOZA  Primary Care Physician - No PCP, No  Discharge Diagnosis  1.?CVA (cerebrovascular accident)?I63.9  2.?Unspecified severe protein-calorie malnutrition?E43  3.?Tobacco user?Z72.0  4.?HTN (hypertension)?I10  5.?Failure to thrive in adult?R62.7  Surgical Procedures  No procedures recorded for this visit.  Immunizations  No immunizations recorded for this visit.  Hospital Course  ?75-year-old male with history of alcohol abuse brought in for?failure to thrive?with poor appetite and CVA. ?MRI with?suggestion of?acute lacunar?infarct in the left corona radiata.  He was evaluated by SLP with some concern for his swallowing?and is pending?MBSS. ?Carotid ultrasound without evidence of flow-limiting stenosis.?Transfer to acute for continued care.  Time Spent on discharge  Less than 30 minutes  Objective  Vitals & Measurements  T:?36.9? ?C (Oral)? TMIN:?36.5? ?C (Oral)? TMAX:?36.9? ?C (Oral)? HR:?68(Peripheral)? RR:?17? BP:?148/81? SpO2:?100%?  Physical Exam  Awake alert and oriented  Patient Discharge Condition  Stable on examination  Discharge Disposition  Transfer to acute bed   Discharge Medication Reconciliation  Prescribed  acetaminophen-HYDROcodone, Oral, q4hr, PRN pain  aspirin (aspirin 81 mg oral Delayed Release (EC) tablet)?81 mg, Oral, Daily  folic acid (folic acid 1 mg oral tablet)?1 mg, Oral, Daily  losartan (Cozaar 50 mg oral tablet)?50 mg, Oral, Daily  Continue  potassium chloride (potassium chloride 20 mEq oral TABLET extended release)?20 mEq, Oral, BID  Education and Orders Provided  Coronary Artery Disease, Male  Smoking Cessation, Tips for Success (CUSTOM)  Failure to  Thrive, Adult, Easy-to-Read  Rehabilitation After a Stroke, Adult  Stroke Prevention  Hypertension, Adult  Deconditioning  Discharge - 05/27/21 12:53:00 CDT, Transfer to another hospital, Give all scheduled vaccinations prior to discharge.?  Discharge Activity - Activity as Tolerated?  Discharge Diet - Cardiac?  Follow up  Jeanie Elias, within 1 week  Car Seat Challenge  No Qualifying Data